# Patient Record
Sex: FEMALE | Race: WHITE | NOT HISPANIC OR LATINO | URBAN - METROPOLITAN AREA
[De-identification: names, ages, dates, MRNs, and addresses within clinical notes are randomized per-mention and may not be internally consistent; named-entity substitution may affect disease eponyms.]

---

## 2018-12-22 ENCOUNTER — INPATIENT (INPATIENT)
Facility: HOSPITAL | Age: 83
LOS: 4 days | Discharge: NURSING, REHAB OR LONG-TERM CARE | End: 2018-12-27
Attending: INTERNAL MEDICINE | Admitting: INTERNAL MEDICINE
Payer: MEDICARE

## 2018-12-22 VITALS
TEMPERATURE: 97 F | DIASTOLIC BLOOD PRESSURE: 53 MMHG | HEART RATE: 82 BPM | WEIGHT: 100.09 LBS | RESPIRATION RATE: 16 BRPM | SYSTOLIC BLOOD PRESSURE: 142 MMHG | OXYGEN SATURATION: 97 % | HEIGHT: 66 IN

## 2018-12-22 DIAGNOSIS — Z98.890 OTHER SPECIFIED POSTPROCEDURAL STATES: Chronic | ICD-10-CM

## 2018-12-22 LAB
ABO RH CONFIRMATION: SIGNIFICANT CHANGE UP
ALBUMIN SERPL ELPH-MCNC: 3.6 G/DL — SIGNIFICANT CHANGE UP (ref 3.3–5)
ALP SERPL-CCNC: 70 U/L — SIGNIFICANT CHANGE UP (ref 40–120)
ALT FLD-CCNC: 20 U/L — SIGNIFICANT CHANGE UP (ref 12–78)
ANION GAP SERPL CALC-SCNC: 8 MMOL/L — SIGNIFICANT CHANGE UP (ref 5–17)
APPEARANCE UR: CLEAR — SIGNIFICANT CHANGE UP
APTT BLD: 25.3 SEC — LOW (ref 27.5–36.3)
AST SERPL-CCNC: 23 U/L — SIGNIFICANT CHANGE UP (ref 15–37)
BACTERIA # UR AUTO: ABNORMAL
BASOPHILS # BLD AUTO: 0.04 K/UL — SIGNIFICANT CHANGE UP (ref 0–0.2)
BASOPHILS NFR BLD AUTO: 0.2 % — SIGNIFICANT CHANGE UP (ref 0–2)
BILIRUB SERPL-MCNC: 0.4 MG/DL — SIGNIFICANT CHANGE UP (ref 0.2–1.2)
BILIRUB UR-MCNC: NEGATIVE — SIGNIFICANT CHANGE UP
BLD GP AB SCN SERPL QL: SIGNIFICANT CHANGE UP
BUN SERPL-MCNC: 31 MG/DL — HIGH (ref 7–23)
CALCIUM SERPL-MCNC: 9 MG/DL — SIGNIFICANT CHANGE UP (ref 8.5–10.1)
CHLORIDE SERPL-SCNC: 107 MMOL/L — SIGNIFICANT CHANGE UP (ref 96–108)
CO2 SERPL-SCNC: 27 MMOL/L — SIGNIFICANT CHANGE UP (ref 22–31)
COLOR SPEC: YELLOW — SIGNIFICANT CHANGE UP
CREAT SERPL-MCNC: 1.07 MG/DL — SIGNIFICANT CHANGE UP (ref 0.5–1.3)
DIFF PNL FLD: ABNORMAL
EOSINOPHIL # BLD AUTO: 0 K/UL — SIGNIFICANT CHANGE UP (ref 0–0.5)
EOSINOPHIL NFR BLD AUTO: 0 % — SIGNIFICANT CHANGE UP (ref 0–6)
EPI CELLS # UR: NEGATIVE — SIGNIFICANT CHANGE UP
GLUCOSE SERPL-MCNC: 135 MG/DL — HIGH (ref 70–99)
GLUCOSE UR QL: NEGATIVE MG/DL — SIGNIFICANT CHANGE UP
HCT VFR BLD CALC: 45.6 % — HIGH (ref 34.5–45)
HGB BLD-MCNC: 15.2 G/DL — SIGNIFICANT CHANGE UP (ref 11.5–15.5)
HYALINE CASTS # UR AUTO: ABNORMAL /LPF
IMM GRANULOCYTES NFR BLD AUTO: 0.4 % — SIGNIFICANT CHANGE UP (ref 0–1.5)
INR BLD: 0.97 RATIO — SIGNIFICANT CHANGE UP (ref 0.88–1.16)
KETONES UR-MCNC: ABNORMAL
LEUKOCYTE ESTERASE UR-ACNC: ABNORMAL
LYMPHOCYTES # BLD AUTO: 0.94 K/UL — LOW (ref 1–3.3)
LYMPHOCYTES # BLD AUTO: 5.3 % — LOW (ref 13–44)
MCHC RBC-ENTMCNC: 30.6 PG — SIGNIFICANT CHANGE UP (ref 27–34)
MCHC RBC-ENTMCNC: 33.3 GM/DL — SIGNIFICANT CHANGE UP (ref 32–36)
MCV RBC AUTO: 91.8 FL — SIGNIFICANT CHANGE UP (ref 80–100)
MONOCYTES # BLD AUTO: 0.66 K/UL — SIGNIFICANT CHANGE UP (ref 0–0.9)
MONOCYTES NFR BLD AUTO: 3.7 % — SIGNIFICANT CHANGE UP (ref 2–14)
NEUTROPHILS # BLD AUTO: 16.15 K/UL — HIGH (ref 1.8–7.4)
NEUTROPHILS NFR BLD AUTO: 90.4 % — HIGH (ref 43–77)
NITRITE UR-MCNC: NEGATIVE — SIGNIFICANT CHANGE UP
NRBC # BLD: 0 /100 WBCS — SIGNIFICANT CHANGE UP (ref 0–0)
PH UR: 5 — SIGNIFICANT CHANGE UP (ref 5–8)
PLATELET # BLD AUTO: 185 K/UL — SIGNIFICANT CHANGE UP (ref 150–400)
POTASSIUM SERPL-MCNC: 4.2 MMOL/L — SIGNIFICANT CHANGE UP (ref 3.5–5.3)
POTASSIUM SERPL-SCNC: 4.2 MMOL/L — SIGNIFICANT CHANGE UP (ref 3.5–5.3)
PROT SERPL-MCNC: 7.3 GM/DL — SIGNIFICANT CHANGE UP (ref 6–8.3)
PROT UR-MCNC: 15 MG/DL
PROTHROM AB SERPL-ACNC: 10.8 SEC — SIGNIFICANT CHANGE UP (ref 10–12.9)
RBC # BLD: 4.97 M/UL — SIGNIFICANT CHANGE UP (ref 3.8–5.2)
RBC # FLD: 13.2 % — SIGNIFICANT CHANGE UP (ref 10.3–14.5)
RBC CASTS # UR COMP ASSIST: SIGNIFICANT CHANGE UP /HPF (ref 0–4)
SODIUM SERPL-SCNC: 142 MMOL/L — SIGNIFICANT CHANGE UP (ref 135–145)
SP GR SPEC: 1.02 — SIGNIFICANT CHANGE UP (ref 1.01–1.02)
TYPE + AB SCN PNL BLD: SIGNIFICANT CHANGE UP
UROBILINOGEN FLD QL: NEGATIVE MG/DL — SIGNIFICANT CHANGE UP
WBC # BLD: 17.86 K/UL — HIGH (ref 3.8–10.5)
WBC # FLD AUTO: 17.86 K/UL — HIGH (ref 3.8–10.5)
WBC UR QL: SIGNIFICANT CHANGE UP

## 2018-12-22 PROCEDURE — 93010 ELECTROCARDIOGRAM REPORT: CPT

## 2018-12-22 PROCEDURE — 72125 CT NECK SPINE W/O DYE: CPT | Mod: 26

## 2018-12-22 PROCEDURE — 74176 CT ABD & PELVIS W/O CONTRAST: CPT | Mod: 26

## 2018-12-22 PROCEDURE — 73552 X-RAY EXAM OF FEMUR 2/>: CPT | Mod: 26,LT

## 2018-12-22 PROCEDURE — 99285 EMERGENCY DEPT VISIT HI MDM: CPT

## 2018-12-22 PROCEDURE — 73502 X-RAY EXAM HIP UNI 2-3 VIEWS: CPT | Mod: 26,LT

## 2018-12-22 PROCEDURE — 70450 CT HEAD/BRAIN W/O DYE: CPT | Mod: 26

## 2018-12-22 PROCEDURE — 71045 X-RAY EXAM CHEST 1 VIEW: CPT | Mod: 26

## 2018-12-22 PROCEDURE — 73562 X-RAY EXAM OF KNEE 3: CPT | Mod: 26,LT

## 2018-12-22 PROCEDURE — 71250 CT THORAX DX C-: CPT | Mod: 26

## 2018-12-22 RX ORDER — SODIUM CHLORIDE 9 MG/ML
500 INJECTION INTRAMUSCULAR; INTRAVENOUS; SUBCUTANEOUS ONCE
Qty: 0 | Refills: 0 | Status: COMPLETED | OUTPATIENT
Start: 2018-12-22 | End: 2018-12-22

## 2018-12-22 RX ORDER — SODIUM CHLORIDE 9 MG/ML
3 INJECTION INTRAMUSCULAR; INTRAVENOUS; SUBCUTANEOUS ONCE
Qty: 0 | Refills: 0 | Status: COMPLETED | OUTPATIENT
Start: 2018-12-22 | End: 2018-12-22

## 2018-12-22 RX ORDER — MORPHINE SULFATE 50 MG/1
2 CAPSULE, EXTENDED RELEASE ORAL ONCE
Qty: 0 | Refills: 0 | Status: DISCONTINUED | OUTPATIENT
Start: 2018-12-22 | End: 2018-12-22

## 2018-12-22 RX ORDER — SODIUM CHLORIDE 9 MG/ML
1000 INJECTION, SOLUTION INTRAVENOUS
Qty: 0 | Refills: 0 | Status: DISCONTINUED | OUTPATIENT
Start: 2018-12-22 | End: 2018-12-23

## 2018-12-22 RX ORDER — ACETAMINOPHEN 500 MG
650 TABLET ORAL EVERY 6 HOURS
Qty: 0 | Refills: 0 | Status: DISCONTINUED | OUTPATIENT
Start: 2018-12-22 | End: 2018-12-23

## 2018-12-22 RX ORDER — ENOXAPARIN SODIUM 100 MG/ML
30 INJECTION SUBCUTANEOUS ONCE
Qty: 0 | Refills: 0 | Status: COMPLETED | OUTPATIENT
Start: 2018-12-22 | End: 2018-12-22

## 2018-12-22 RX ORDER — ACETAMINOPHEN 500 MG
1000 TABLET ORAL ONCE
Qty: 0 | Refills: 0 | Status: COMPLETED | OUTPATIENT
Start: 2018-12-22 | End: 2018-12-22

## 2018-12-22 RX ORDER — ONDANSETRON 8 MG/1
4 TABLET, FILM COATED ORAL EVERY 6 HOURS
Qty: 0 | Refills: 0 | Status: DISCONTINUED | OUTPATIENT
Start: 2018-12-22 | End: 2018-12-23

## 2018-12-22 RX ORDER — DOCUSATE SODIUM 100 MG
100 CAPSULE ORAL THREE TIMES A DAY
Qty: 0 | Refills: 0 | Status: DISCONTINUED | OUTPATIENT
Start: 2018-12-22 | End: 2018-12-23

## 2018-12-22 RX ADMIN — Medication 400 MILLIGRAM(S): at 15:36

## 2018-12-22 RX ADMIN — MORPHINE SULFATE 2 MILLIGRAM(S): 50 CAPSULE, EXTENDED RELEASE ORAL at 12:02

## 2018-12-22 RX ADMIN — SODIUM CHLORIDE 3 MILLILITER(S): 9 INJECTION INTRAMUSCULAR; INTRAVENOUS; SUBCUTANEOUS at 12:03

## 2018-12-22 RX ADMIN — ENOXAPARIN SODIUM 30 MILLIGRAM(S): 100 INJECTION SUBCUTANEOUS at 15:36

## 2018-12-22 RX ADMIN — Medication 100 MILLIGRAM(S): at 23:03

## 2018-12-22 RX ADMIN — MORPHINE SULFATE 2 MILLIGRAM(S): 50 CAPSULE, EXTENDED RELEASE ORAL at 12:26

## 2018-12-22 RX ADMIN — SODIUM CHLORIDE 500 MILLILITER(S): 9 INJECTION INTRAMUSCULAR; INTRAVENOUS; SUBCUTANEOUS at 12:02

## 2018-12-22 NOTE — H&P ADULT - NSHPPHYSICALEXAM_GEN_ALL_CORE
Vital Signs Last 24 Hrs  T(C): 36.1 (22 Dec 2018 09:27), Max: 36.1 (22 Dec 2018 09:27)  T(F): 97 (22 Dec 2018 09:27), Max: 97 (22 Dec 2018 09:27)  HR: 82 (22 Dec 2018 09:27) (82 - 82)  BP: 142/53 (22 Dec 2018 09:27) (142/53 - 142/53)  BP(mean): --  RR: 16 (22 Dec 2018 09:27) (16 - 16)  SpO2: 97% (22 Dec 2018 09:27) (97% - 97%)

## 2018-12-22 NOTE — ED PROVIDER NOTE - ENMT, MLM
neck non tender- supple. Airway patent, Nasal mucosa clear. Mouth with normal mucosa. Throat has no vesicles, no oropharyngeal exudates and uvula is midline.

## 2018-12-22 NOTE — CHART NOTE - NSCHARTNOTEFT_GEN_A_CORE
Ortho Preop Note    Patient is a 86y old  Female who presents with a chief complaint of L hip pain    Diagnosis: L FN Fx  Procedure: Thom  Surgeon: Harley                          15.2   17.86 )-----------( 185      ( 22 Dec 2018 11:52 )             45.6     12    142  |  107  |  31<H>  ----------------------------<  135<H>  4.2   |  27  |  1.07    Ca    9.0      22 Dec 2018 11:52    TPro  7.3  /  Alb  3.6  /  TBili  0.4  /  DBili  x   /  AST  23  /  ALT  20  /  AlkPhos  70      PT/INR - ( 22 Dec 2018 12:52 )   PT: 10.8 sec;   INR: 0.97 ratio         PTT - ( 22 Dec 2018 12:52 )  PTT:25.3 sec  Urinalysis Basic - ( 22 Dec 2018 11:52 )    Color: Yellow / Appearance: Clear / S.025 / pH: x  Gluc: x / Ketone: Trace  / Bili: Negative / Urobili: Negative mg/dL   Blood: x / Protein: 15 mg/dL / Nitrite: Negative   Leuk Esterase: Trace / RBC: 0-2 /HPF / WBC 0-2   Sq Epi: x / Non Sq Epi: Negative / Bacteria: Occasional        [x ] Type & Screen  [x ] CBC  [x ] BMP  [x ] PT/PTT/INR  [x ] Urinalysis  [x ] Chest X-ray  [x ] EKG  [x ] NPO/IVF  [x ] Consent  [x ] Clearance  [x ] Added on to OR Schedule  [x ] Anti-coagulation held      Ortho Pager [phone]

## 2018-12-22 NOTE — H&P ADULT - ASSESSMENT
#S/p mechanical fall with LT hip fracture  Admit to 2 [facility] eval appreciated  Bedrest  NPO p MN for OR tomorrow  Pain meds prn, will try to avoid narcotics as will make dementia worse  DVT proph- Lovenox x1 then on hold for OR  Patient is medically optimized for OR    #Dementia  Supportive    #Dispo- inpatient admit, pt is medically optimized for OR

## 2018-12-22 NOTE — ED PROVIDER NOTE - MEDICAL DECISION MAKING DETAILS
elderly female +dementia BIBA s/p supposed mechanical fall, unknown if head injury, pt without memory of incident, hx VIA daughter who did not witness event, pt c/o back pain and unable to bear weight on L leg/ L hip pain. Pt unable to lift SLR. Plan PAN SCAN, non contrast, XR, pelvis L hip/L femur/ L knee, labs, EKG, observe and re-assess.

## 2018-12-22 NOTE — H&P ADULT - HISTORY OF PRESENT ILLNESS
87yo/F with PMH dementia, chronic back pain s/p prior laminectomies x2  presented s/p mechanical fall. She was walking to get her robe when turned and lost her balance sustaining fall. She was c/o LT hip pain and inability to ambulate afterwards. Denies LOC. No head trauma. Not on Aspirin or blood thinners. No prior h/o cardiac problems

## 2018-12-22 NOTE — ED PROVIDER NOTE - SKIN, MLM
two old scabs  to R forehead, no clinical evidence of facial injury. Skin normal color for race, warm, dry and intact. No evidence of rash.

## 2018-12-22 NOTE — CONSULT NOTE ADULT - SUBJECTIVE AND OBJECTIVE BOX
86y Female presents to [facility] ED s/p [facility] fall earlier today c/o severe L hip pain and inability to ambulate.  Limited ability to give history given baseline dementia; Daughter at bedside. Patient denies headstrike or LOC. Localizes pain to L hip/femur. Patient denies radiation of pain. Patient denies numbness/tingling/burning in the LLE. No other bone/joint complaints. Patient is a home ambulator at baseline with occasional use of walker. Of note, she underwent two lumbar surgeries that her daughter at bedside describes as laminectomies however as a result has had chronic R foot drop. She has an AFO she doesnt wear. Mostly walks around the house without her walker. Does have a high steppage gait to clear her right foot per her daughter. Patient has no issues w/ ADLs/IADLs.     PAST MEDICAL & SURGICAL HISTORY:  Dementia  History of lumbar laminectomy    MEDICATIONS  (STANDING):  docusate sodium 100 milliGRAM(s) Oral three times a day  multivitamin 1 Tablet(s) Oral daily    MEDICATIONS  (PRN):  acetaminophen   Tablet .. 650 milliGRAM(s) Oral every 6 hours PRN Temp greater or equal to 38C (100.4F), Mild Pain (1 - 3)  aluminum hydroxide/magnesium hydroxide/simethicone Suspension 30 milliLiter(s) Oral every 4 hours PRN Dyspepsia  ondansetron Injectable 4 milliGRAM(s) IV Push every 6 hours PRN Nausea    Allergies    aspirin (Unknown)    Intolerances      T(C): 36.8 (12-22-18 @ 15:38), Max: 36.8 (12-22-18 @ 15:38)  HR: 92 (12-22-18 @ 15:38) (82 - 92)  BP: 139/60 (12-22-18 @ 15:38) (139/60 - 142/53)  RR: 17 (12-22-18 @ 15:38) (16 - 17)  SpO2: 94% (12-22-18 @ 15:38) (94% - 97%)  Wt(kg): --    PE   LLE:  Skin intact; No ecchymosis/soft tissue swelling  Compartments soft; + TTP about hip. No TTP to knee/leg/ankle/foot   ROM limited 2/2 pain   Unable to SLR; + Log Roll/Heel Strike  Motor intact GS/TA/FHL/EHL  SILT L2-S1  DP/PT pulses 2+    RLE/BUE:   No bony TTP; Good ROM w/o pain; Exam Unremarkable    RLE:   0/5 Dorsiflexion; 2/5 EHL.   5/5 Plantarflexion; 5/5 FHL.   SILT to entire RLE.     Secondary Survey: No TTP over bony prominences, SILT, palpable pulses, full/painless range of motion, compartments soft  TTP about the lumbar paraspinal musculature. No midline TTP.   No thoracic or cervical TTP.    Imaging:  XR demonstrating displaced L femoral neck fracture.     CT abdomen/pelvis/chest reviewed by radiologist. No acute bony abnormalities per radiology.     86y Female with displaced L femoral neck fracture    - Pain control  - NPO/IVF after midnight tonight  - FU UCx  - EKG  - CXR pending  - Medical clearance - cleared by Dr. Haynes  - Plan for OR for L hip hemiarthroplasty tomorrow with Dr. Souza at 8am  - Surgical consent obtained with daughter/HCP at bedside.   - May require use of RLE AFO for post op rehab, will have daughter bring from home.   - Above reviewed with Dr. Souza who agrees with plan.

## 2018-12-22 NOTE — H&P ADULT - NSHPLABSRESULTS_GEN_ALL_CORE
15.2   17.86 )-----------( 185      ( 22 Dec 2018 11:52 )             45.6     22 Dec 2018 11:52    142    |  107    |  31     ----------------------------<  135    4.2     |  27     |  1.07     Ca    9.0        22 Dec 2018 11:52    TPro  7.3    /  Alb  3.6    /  TBili  0.4    /  DBili  x      /  AST  23     /  ALT  20     /  AlkPhos  70     22 Dec 2018 11:52    LIVER FUNCTIONS - ( 22 Dec 2018 11:52 )  Alb: 3.6 g/dL / Pro: 7.3 gm/dL / ALK PHOS: 70 U/L / ALT: 20 U/L / AST: 23 U/L / GGT: x           PT/INR - ( 22 Dec 2018 12:52 )   PT: 10.8 sec;   INR: 0.97 ratio      PTT - ( 22 Dec 2018 12:52 )  PTT:25.3 sec    Urinalysis Basic - ( 22 Dec 2018 11:52 )  Color: Yellow / Appearance: Clear / S.025 / pH: x  Gluc: x / Ketone: Trace  / Bili: Negative / Urobili: Negative mg/dL   Blood: x / Protein: 15 mg/dL / Nitrite: Negative   Leuk Esterase: Trace / RBC: 0-2 /HPF / WBC 0-2   Sq Epi: x / Non Sq Epi: Negative / Bacteria: Occasional

## 2018-12-22 NOTE — ED PROVIDER NOTE - OBJECTIVE STATEMENT
87 y/o F with a PMHx of Dementia, accompanied by daughter Maria Alejandra who reports patient is here for an evaluation of L leg pain s/p mechanical fall. Pt's  reportedly noted pt was walking over to the other side of the bed to get her robe when she turned around and fell; when the daughter came up to see her she was found on her back. Pt was c/o of L leg pain and back pain. Daughter tried to get patient up to walk but had difficult bearing weight to L leg. Denies anticoagulants. NKDA. Denies head ache, neck pain, shoulder pain, b/l arm pain. PMD. .

## 2018-12-22 NOTE — ED PROVIDER NOTE - UNABLE TO OBTAIN
PATIENT IS A POOR HISTORIAN, HX OBTAINED BY DAUGHTER, NO ACTIVE COMPLAINTS. Dementia PATIENT IS A POOR HISTORIAN, HX OBTAINED BY DAUGHTER.

## 2018-12-22 NOTE — CONSULT NOTE ADULT - ATTENDING COMMENTS
Pt seen and examined.  Confused.   H&P and chart reviewed as well as relevant imaging.   Left FN hip fx displaced  Surgical indications met vs. conservative tx.  All RBA discussed at length which include but are not limited to bleeding infection, nerve, vascular damage, DVT, PE, foot drop, louie-implant fracture, limb length inequality, loss of limb, loss of life, risks associated with general anesthesia.  All questions answered.  Plan for left hip hemiarthroplasty.

## 2018-12-22 NOTE — ED PROVIDER NOTE - CARE PLAN
Principal Discharge DX:	Closed fracture of left hip, initial encounter  Secondary Diagnosis:	Fall, initial encounter  Secondary Diagnosis:	Dementia

## 2018-12-23 ENCOUNTER — RESULT REVIEW (OUTPATIENT)
Age: 83
End: 2018-12-23

## 2018-12-23 ENCOUNTER — TRANSCRIPTION ENCOUNTER (OUTPATIENT)
Age: 83
End: 2018-12-23

## 2018-12-23 LAB
ANION GAP SERPL CALC-SCNC: 7 MMOL/L — SIGNIFICANT CHANGE UP (ref 5–17)
ANION GAP SERPL CALC-SCNC: 8 MMOL/L — SIGNIFICANT CHANGE UP (ref 5–17)
APTT BLD: 28.7 SEC — SIGNIFICANT CHANGE UP (ref 27.5–36.3)
BUN SERPL-MCNC: 25 MG/DL — HIGH (ref 7–23)
BUN SERPL-MCNC: 28 MG/DL — HIGH (ref 7–23)
CALCIUM SERPL-MCNC: 8.2 MG/DL — LOW (ref 8.5–10.1)
CALCIUM SERPL-MCNC: 8.7 MG/DL — SIGNIFICANT CHANGE UP (ref 8.5–10.1)
CHLORIDE SERPL-SCNC: 108 MMOL/L — SIGNIFICANT CHANGE UP (ref 96–108)
CHLORIDE SERPL-SCNC: 108 MMOL/L — SIGNIFICANT CHANGE UP (ref 96–108)
CO2 SERPL-SCNC: 23 MMOL/L — SIGNIFICANT CHANGE UP (ref 22–31)
CO2 SERPL-SCNC: 28 MMOL/L — SIGNIFICANT CHANGE UP (ref 22–31)
CREAT SERPL-MCNC: 0.85 MG/DL — SIGNIFICANT CHANGE UP (ref 0.5–1.3)
CREAT SERPL-MCNC: 1.07 MG/DL — SIGNIFICANT CHANGE UP (ref 0.5–1.3)
CULTURE RESULTS: NO GROWTH — SIGNIFICANT CHANGE UP
GLUCOSE SERPL-MCNC: 118 MG/DL — HIGH (ref 70–99)
GLUCOSE SERPL-MCNC: 126 MG/DL — HIGH (ref 70–99)
HCT VFR BLD CALC: 43 % — SIGNIFICANT CHANGE UP (ref 34.5–45)
HCT VFR BLD CALC: 43.9 % — SIGNIFICANT CHANGE UP (ref 34.5–45)
HGB BLD-MCNC: 14.4 G/DL — SIGNIFICANT CHANGE UP (ref 11.5–15.5)
HGB BLD-MCNC: 14.9 G/DL — SIGNIFICANT CHANGE UP (ref 11.5–15.5)
INR BLD: 1.1 RATIO — SIGNIFICANT CHANGE UP (ref 0.88–1.16)
MCHC RBC-ENTMCNC: 30 PG — SIGNIFICANT CHANGE UP (ref 27–34)
MCHC RBC-ENTMCNC: 30.6 PG — SIGNIFICANT CHANGE UP (ref 27–34)
MCHC RBC-ENTMCNC: 33.5 GM/DL — SIGNIFICANT CHANGE UP (ref 32–36)
MCHC RBC-ENTMCNC: 33.9 GM/DL — SIGNIFICANT CHANGE UP (ref 32–36)
MCV RBC AUTO: 88.3 FL — SIGNIFICANT CHANGE UP (ref 80–100)
MCV RBC AUTO: 91.3 FL — SIGNIFICANT CHANGE UP (ref 80–100)
NRBC # BLD: 0 /100 WBCS — SIGNIFICANT CHANGE UP (ref 0–0)
NRBC # BLD: 0 /100 WBCS — SIGNIFICANT CHANGE UP (ref 0–0)
PLATELET # BLD AUTO: 154 K/UL — SIGNIFICANT CHANGE UP (ref 150–400)
PLATELET # BLD AUTO: 156 K/UL — SIGNIFICANT CHANGE UP (ref 150–400)
POTASSIUM SERPL-MCNC: 3.9 MMOL/L — SIGNIFICANT CHANGE UP (ref 3.5–5.3)
POTASSIUM SERPL-MCNC: 4.6 MMOL/L — SIGNIFICANT CHANGE UP (ref 3.5–5.3)
POTASSIUM SERPL-SCNC: 3.9 MMOL/L — SIGNIFICANT CHANGE UP (ref 3.5–5.3)
POTASSIUM SERPL-SCNC: 4.6 MMOL/L — SIGNIFICANT CHANGE UP (ref 3.5–5.3)
PROTHROM AB SERPL-ACNC: 12.2 SEC — SIGNIFICANT CHANGE UP (ref 10–12.9)
RBC # BLD: 4.71 M/UL — SIGNIFICANT CHANGE UP (ref 3.8–5.2)
RBC # BLD: 4.97 M/UL — SIGNIFICANT CHANGE UP (ref 3.8–5.2)
RBC # FLD: 13.2 % — SIGNIFICANT CHANGE UP (ref 10.3–14.5)
RBC # FLD: 13.3 % — SIGNIFICANT CHANGE UP (ref 10.3–14.5)
SODIUM SERPL-SCNC: 139 MMOL/L — SIGNIFICANT CHANGE UP (ref 135–145)
SODIUM SERPL-SCNC: 143 MMOL/L — SIGNIFICANT CHANGE UP (ref 135–145)
SPECIMEN SOURCE: SIGNIFICANT CHANGE UP
WBC # BLD: 16.38 K/UL — HIGH (ref 3.8–10.5)
WBC # BLD: 19.42 K/UL — HIGH (ref 3.8–10.5)
WBC # FLD AUTO: 16.38 K/UL — HIGH (ref 3.8–10.5)
WBC # FLD AUTO: 19.42 K/UL — HIGH (ref 3.8–10.5)

## 2018-12-23 PROCEDURE — 93010 ELECTROCARDIOGRAM REPORT: CPT

## 2018-12-23 PROCEDURE — 99223 1ST HOSP IP/OBS HIGH 75: CPT

## 2018-12-23 PROCEDURE — 88305 TISSUE EXAM BY PATHOLOGIST: CPT | Mod: 26

## 2018-12-23 PROCEDURE — 76000 FLUOROSCOPY <1 HR PHYS/QHP: CPT | Mod: 26

## 2018-12-23 PROCEDURE — 99223 1ST HOSP IP/OBS HIGH 75: CPT | Mod: 57

## 2018-12-23 PROCEDURE — 73501 X-RAY EXAM HIP UNI 1 VIEW: CPT | Mod: 26,LT

## 2018-12-23 PROCEDURE — 27125 PARTIAL HIP REPLACEMENT: CPT | Mod: LT

## 2018-12-23 PROCEDURE — 88311 DECALCIFY TISSUE: CPT | Mod: 26

## 2018-12-23 RX ORDER — SODIUM CHLORIDE 9 MG/ML
1000 INJECTION INTRAMUSCULAR; INTRAVENOUS; SUBCUTANEOUS
Qty: 0 | Refills: 0 | Status: DISCONTINUED | OUTPATIENT
Start: 2018-12-23 | End: 2018-12-23

## 2018-12-23 RX ORDER — SODIUM CHLORIDE 9 MG/ML
1000 INJECTION, SOLUTION INTRAVENOUS
Qty: 0 | Refills: 0 | Status: DISCONTINUED | OUTPATIENT
Start: 2018-12-23 | End: 2018-12-26

## 2018-12-23 RX ORDER — LISINOPRIL 2.5 MG/1
2.5 TABLET ORAL DAILY
Qty: 0 | Refills: 0 | Status: DISCONTINUED | OUTPATIENT
Start: 2018-12-23 | End: 2018-12-26

## 2018-12-23 RX ORDER — POLYETHYLENE GLYCOL 3350 17 G/17G
17 POWDER, FOR SOLUTION ORAL DAILY
Qty: 0 | Refills: 0 | Status: DISCONTINUED | OUTPATIENT
Start: 2018-12-23 | End: 2018-12-27

## 2018-12-23 RX ORDER — DOCUSATE SODIUM 100 MG
100 CAPSULE ORAL THREE TIMES A DAY
Qty: 0 | Refills: 0 | Status: DISCONTINUED | OUTPATIENT
Start: 2018-12-23 | End: 2018-12-27

## 2018-12-23 RX ORDER — FENTANYL CITRATE 50 UG/ML
50 INJECTION INTRAVENOUS
Qty: 0 | Refills: 0 | Status: DISCONTINUED | OUTPATIENT
Start: 2018-12-23 | End: 2018-12-23

## 2018-12-23 RX ORDER — OXYCODONE HYDROCHLORIDE 5 MG/1
2.5 TABLET ORAL EVERY 4 HOURS
Qty: 0 | Refills: 0 | Status: DISCONTINUED | OUTPATIENT
Start: 2018-12-23 | End: 2018-12-27

## 2018-12-23 RX ORDER — HEPARIN SODIUM 5000 [USP'U]/ML
5000 INJECTION INTRAVENOUS; SUBCUTANEOUS EVERY 8 HOURS
Qty: 0 | Refills: 0 | Status: DISCONTINUED | OUTPATIENT
Start: 2018-12-23 | End: 2018-12-27

## 2018-12-23 RX ORDER — SENNA PLUS 8.6 MG/1
2 TABLET ORAL AT BEDTIME
Qty: 0 | Refills: 0 | Status: DISCONTINUED | OUTPATIENT
Start: 2018-12-23 | End: 2018-12-27

## 2018-12-23 RX ORDER — ACETAMINOPHEN 500 MG
650 TABLET ORAL EVERY 6 HOURS
Qty: 0 | Refills: 0 | Status: COMPLETED | OUTPATIENT
Start: 2018-12-23 | End: 2018-12-26

## 2018-12-23 RX ORDER — ONDANSETRON 8 MG/1
4 TABLET, FILM COATED ORAL ONCE
Qty: 0 | Refills: 0 | Status: DISCONTINUED | OUTPATIENT
Start: 2018-12-23 | End: 2018-12-23

## 2018-12-23 RX ORDER — HYDRALAZINE HCL 50 MG
5 TABLET ORAL ONCE
Qty: 0 | Refills: 0 | Status: COMPLETED | OUTPATIENT
Start: 2018-12-23 | End: 2018-12-23

## 2018-12-23 RX ORDER — CEFAZOLIN SODIUM 1 G
2000 VIAL (EA) INJECTION EVERY 8 HOURS
Qty: 0 | Refills: 0 | Status: COMPLETED | OUTPATIENT
Start: 2018-12-23 | End: 2018-12-24

## 2018-12-23 RX ORDER — OXYCODONE HYDROCHLORIDE 5 MG/1
5 TABLET ORAL EVERY 4 HOURS
Qty: 0 | Refills: 0 | Status: DISCONTINUED | OUTPATIENT
Start: 2018-12-23 | End: 2018-12-27

## 2018-12-23 RX ORDER — ONDANSETRON 8 MG/1
4 TABLET, FILM COATED ORAL EVERY 6 HOURS
Qty: 0 | Refills: 0 | Status: DISCONTINUED | OUTPATIENT
Start: 2018-12-23 | End: 2018-12-27

## 2018-12-23 RX ORDER — HYDROMORPHONE HYDROCHLORIDE 2 MG/ML
0.5 INJECTION INTRAMUSCULAR; INTRAVENOUS; SUBCUTANEOUS EVERY 4 HOURS
Qty: 0 | Refills: 0 | Status: DISCONTINUED | OUTPATIENT
Start: 2018-12-23 | End: 2018-12-23

## 2018-12-23 RX ADMIN — OXYCODONE HYDROCHLORIDE 5 MILLIGRAM(S): 5 TABLET ORAL at 16:50

## 2018-12-23 RX ADMIN — Medication 5 MILLIGRAM(S): at 02:22

## 2018-12-23 RX ADMIN — Medication 100 MILLIGRAM(S): at 17:22

## 2018-12-23 RX ADMIN — SODIUM CHLORIDE 70 MILLILITER(S): 9 INJECTION, SOLUTION INTRAVENOUS at 02:22

## 2018-12-23 RX ADMIN — Medication 650 MILLIGRAM(S): at 03:51

## 2018-12-23 RX ADMIN — SODIUM CHLORIDE 75 MILLILITER(S): 9 INJECTION INTRAMUSCULAR; INTRAVENOUS; SUBCUTANEOUS at 11:23

## 2018-12-23 RX ADMIN — Medication 650 MILLIGRAM(S): at 17:21

## 2018-12-23 RX ADMIN — HEPARIN SODIUM 5000 UNIT(S): 5000 INJECTION INTRAVENOUS; SUBCUTANEOUS at 20:56

## 2018-12-23 RX ADMIN — OXYCODONE HYDROCHLORIDE 5 MILLIGRAM(S): 5 TABLET ORAL at 15:54

## 2018-12-23 RX ADMIN — Medication 650 MILLIGRAM(S): at 03:21

## 2018-12-23 RX ADMIN — LISINOPRIL 2.5 MILLIGRAM(S): 2.5 TABLET ORAL at 15:53

## 2018-12-23 RX ADMIN — Medication 100 MILLIGRAM(S): at 15:55

## 2018-12-23 NOTE — DISCHARGE NOTE ADULT - ADDITIONAL INSTRUCTIONS
remove marcum in 3-4 days when more ambulatory for trial of void. if still unable to void, consult urology.

## 2018-12-23 NOTE — DISCHARGE NOTE ADULT - CARE PROVIDER_API CALL
Fernie Souza (DO), Orthopaedic Surgery  [address] [place] [address]  Phone: [phone]  Fax: [phone]

## 2018-12-23 NOTE — CONSULT NOTE ADULT - SUBJECTIVE AND OBJECTIVE BOX
HPI:  87yo/F with PMH dementia, chronic back pain s/p prior laminectomies x2  presented s/p mechanical fall. She was walking to get her robe when turned and lost her balance sustaining fall. She was c/o LT hip pain and inability to ambulate afterwards. Denies LOC. No head trauma. Not on Aspirin or blood thinners. No prior h/o cardiac problems (22 Dec 2018 14:32)      Patient is a 86y old  Female who presents with a chief complaint of s/p mechanical fall (23 Dec 2018 06:45)  s/p left hip bertrand on 18.    Consulted by Dr. Fernie Souza   for VTE prophylaxis, risk stratification, and anticoagulation management.    PAST MEDICAL & SURGICAL HISTORY:  Dementia  History of lumbar laminectomy    CAPRINI SCORE    AGE RELATED RISK FACTORS                                                       MOBILITY RELATED FACTORS  [ ] Age 41-60 years                                            (1 Point)                  [ ] Bed rest                                                        (1 Point)  [ ] Age: 61-74 years                                           (2 Points)                [ ] Plaster cast                                                   (2 Points)  [ x] Age= 75 years                                              (3 Points)                 [ ] Bed bound for more than 72 hours                   (2 Points)    DISEASE RELATED RISK FACTORS                                               GENDER SPECIFIC FACTORS  [ ] Edema in the lower extremities                       (1 Point)                  [ ] Pregnancy                                                     (1 Point)  [ ] Varicose veins                                               (1 Point)                  [ ] Post-partum < 6 weeks                                   (1 Point)             [ ] BMI > 25 Kg/m2                                            (1 Point)                  [ ] Hormonal therapy  or oral contraception            (1 Point)                 [ ] Sepsis (in the previous month)                        (1 Point)                  [ ] History of pregnancy complications  [ ] Pneumonia or serious lung disease                                               [ ] Unexplained or recurrent                       (1 Point)           (in the previous month)                               (1 Point)  [ ] Abnormal pulmonary function test                     (1 Point)                 SURGERY RELATED RISK FACTORS  [ ] Acute myocardial infarction                              (1 Point)                 [ ]  Section                                            (1 Point)  [ ] Congestive heart failure (in the previous month)  (1 Point)                 [ ] Minor surgery                                                 (1 Point)   [ ] Inflammatory bowel disease                             (1 Point)                 [ ] Arthroscopic surgery                                        (2 Points)  [ ] Central venous access                                    (2 Points)                [ ] General surgery lasting more than 45 minutes   (2 Points)       [ ] Stroke (in the previous month)                          (5 Points)               [ ] Elective arthroplasty                                        (5 Points)            [  ] Malignancy (present or past )                             (2 points)                                                                                                                                    HEMATOLOGY RELATED FACTORS                                                 TRAUMA RELATED RISK FACTORS  [ ] Prior episodes of VTE                                     (3 Points)                 [x ] Fracture of the hip, pelvis, or leg                       (5 Points)  [ ] Positive family history for VTE                         (3 Points)                 [ ] Acute spinal cord injury (in the previous month)  (5 Points)  [ ] Prothrombin 64736 A                                      (3 Points)                 [ ] Paralysis  (less than 1 month)                          (5 Points)  [ ] Factor V Leiden                                             (3 Points)                 [ ] Multiple Trauma within 1 month                         (5 Points)  [ ] Lupus anticoagulants                                     (3 Points)                                                           [ ] Anticardiolipin antibodies                                (3 Points)                                                       [ ] High homocysteine in the blood                      (3 Points)                                             [ ] Other congenital or acquired thrombophilia       (3 Points)                                                [ ] Heparin induced thrombocytopenia                  (3 Points)                                          Total Score [ 8 ]    IMPROVE Bleeding Risk Score: 4.5    Falls Risk:   High ( x )  Mod (  )  Low (  )  cr cl: 33.4  EBL: 100  ml  18 Pt seen at bedside on 2North daughter present.  Discussed her anticoagulation with heparin for now then will switch to Lovenox once pt is stable.  Daughter v/u of the need and is agreeable.  Pt is alert to person only.     Vital Signs Last 24 Hrs  T(C): 36.6 (23 Dec 2018 11:30), Max: 37 (23 Dec 2018 01:15)  T(F): 97.9 (23 Dec 2018 11:30), Max: 98.6 (23 Dec 2018 01:15)  HR: 82 (23 Dec 2018 11:30) (76 - 99)  BP: 136/58 (23 Dec 2018 11:30) (126/68 - 167/78)  BP(mean): --  RR: 16 (23 Dec 2018 11:30) (13 - 18)  SpO2: 95% (23 Dec 2018 11:30) (94% - 100%)  FAMILY HISTORY:  Family history of cardiac disorder (Father)    Denies any personal or familial history of clotting or bleeding disorders.    Allergies    aspirin (Unknown)    Intolerances        REVIEW OF SYSTEMS    (  )Fever	     (  )Constipation	(  )SOB				(  )Headache	(  )Dysuria  (  )Chills	     (  )Melena	(  )Dyspnea present on exertion	                    (  )Dizziness                    (  )Polyuria  (  )Nausea	     (  )Hematochezia	(  )Cough			                    (  )Syncope   	(  )Hematuria  (  )Vomiting    (  )Chest Pain	(  )Wheezing			( x )Weakness  (  )Diarrhea     (  )Palpitations	(  )Anorexia			(  )Myalgia    All  review of systems negative except ones indicated above. : Yes      PHYSICAL EXAM:    Constitutional: Appears Well    Neurological: A& O x 1 person    Skin: Warm    Respiratory and Thorax: normal effort; Breath sounds: normal; No rales/wheezing/rhonchi  	  Cardiovascular: S1, S2, regular, NMBR	    Gastrointestinal: BS + x 4Q, nontender	    Genitourinary:  Bladder nondistended, nontender    Musculoskeletal:   General Right:   no muscle/joint tenderness,   normal tone, no joint swelling,   ROM: limited/full	    General Left:   no muscle/joint tenderness,   normal tone, no joint swelling,   ROM: limited/full    Hip:  Left: Dressing CDI;     Lower extrems:   Right: no calf tenderness              negative mary's sign               + pedal pulses    Left:   no calf tenderness              negative mary's sign               + pedal pulses                          14.4   19.42 )-----------( 154      ( 23 Dec 2018 11:09 )             43.0           143  |  108  |  25<H>  ----------------------------<  118<H>  4.6   |  28  |  1.07    Ca    8.2<L>      23 Dec 2018 11:09    TPro  7.3  /  Alb  3.6  /  TBili  0.4  /  DBili  x   /  AST  23  /  ALT  20  /  AlkPhos  70  12      PT/INR - ( 23 Dec 2018 04:44 )   PT: 12.2 sec;   INR: 1.10 ratio         PTT - ( 23 Dec 2018 04:44 )  PTT:28.7 sec	  			   Xray Hip w/ Pelvis 2 or 3 Views, Left (12.22.18 @ 12:43) >  Impression:    Displaced impacted angulatedleft femoral neck fracture          MEDICATIONS  (STANDING):  heparin  Injectable 5000 Unit(s) SubCutaneous every 8 hours  lactated ringers. 1000 milliLiter(s) IV Continuous <Continuous>  sodium chloride 0.9%. 1000 milliLiter(s) IV Continuous <Continuous>          DVT Prophylaxis:  LMWH                   (  )  Heparin SQ           ( x )  Coumadin             (  )  Xarelto                  (  )  Eliquis                   (  )  Venodynes           ( x )  Ambulation          ( x )  UFH                       (  )  Contraindicated  (  )

## 2018-12-23 NOTE — CONSULT NOTE ADULT - ASSESSMENT
I:  This is a86yo/F with PMH dementia, chronic back pain s/p prior laminectomies x2  presented s/p mechanical fall. Pt found to have Displaced impacted angulatedleft femoral neck fracture , now s/p left hip bertrand on 12-23-18.  Pt has high thrombosis risks and requires anticoagulation prophylaxis.         :Heparin 5,000 units sq q8h start 2200 today will consider to switch to Lovenox 40mg sq daily for four weeks post procedure one pt is stable.   :daily cbc/bmp  :LE Venodynes  : increase mobility as tolerated  :Thanks for consult will f/u

## 2018-12-23 NOTE — DISCHARGE NOTE ADULT - HOSPITAL COURSE
85yo/F with PMH dementia, chronic back pain s/p prior laminectomies x2  presented s/p mechanical fall. She was walking to get her robe when turned and lost her balance sustaining fall. She was c/o LT hip pain and inability to ambulate afterwards. Denies LOC. No head trauma. Not on Aspirin or blood thinners. No prior h/o cardiac problems . She was admitted with left hip fracture. underwent left hip hemiarthroplasty.   Hospital course notable for TERENCE due to urinary retention/dehydration. Marcum placed and pt given ivf with improvement in renal function.   Pt's daughter reported patient had word finding difficulty since surgery. CT head done today was negative. Felt it was most likely related to anesthesia/narcotics. Pt will be discharged to rehab today. She is advised trial of void at rehab. IF still with difficulty, advise urology evaluation     for physical exam please see progress note from 12/27  LABS:                        11.5   12.12 )-----------( 169      ( 27 Dec 2018 05:46 )             35.0     12-27    142  |  110<H>  |  29<H>  ----------------------------<  109<H>  4.5   |  24  |  0.72    Ca    8.4<L>      27 Dec 2018 05:46    CT Head No Cont (12.27.18 @ 15:39) >  No parenchymal contusion, hemorrhage or extra-axial collection.  No CT evidence of an acute territorial infarction.  Diffuse atrophy, and chronic small vessel ischemic changes.  If symptoms persist, and additional imaging evaluation is needed,   follow-up MRI is suggested.     CT Abdomen and Pelvis No Cont (12.22.18 @ 12:22) >  IMPRESSION:    Displaced left femoral neck fracture.  No other evidence of solid visceral injury in the chest, abdomen or   pelvis on this noncontrast study.      FINAL DIAGNOSIS  #S/p mechanical fall with LT hip fracture   -s/p LT hip hemiarthroplasty  #HTN- resolved  #TERENCE/urinary retention now s/p marcum  #Sinus Tachycardia due to dehydration/pain, improved  #Leukocytosis-likely reactive  #Metabolic encephalopathy  #Dementia  #Hypokalemia    Time taken for dc 60 min  plan d/w daughter at bedside.

## 2018-12-23 NOTE — PROGRESS NOTE ADULT - ASSESSMENT
86F with L femoral neck hip fx  NWB LLE, bedrest  Pain control  NPO  IVF while NPO  Hold chemical DVT ppx  Medical optimization for OR today  Plan for OR today for L hip hemiarthroplasty  Discussed with attending

## 2018-12-23 NOTE — DISCHARGE NOTE ADULT - CARE PLAN
Principal Discharge DX:	Closed fracture of left hip, initial encounter  Goal:	Return to baseline ADLs  Assessment and plan of treatment:	1.	Pain control  2.	Walking with full weight bearing as tolerated, with assistive devices as needed.  3.	DVT prophylaxis  4.	Physical therapy as needed  5.	Follow up with Dr. Souza as outpatient in 10-14 Days after discharge from the hospital or rehab. Call office for appointment.  6.	Remove staples post-op day 14.  7.	Ice and elevate affected area as needed  8.	Keep dressing clean, dry and intact  9.	Posterior hip precautions - Abduction pillow while seated or supine. Do not bend hip past 90 degrees. Do not turn knee/foot inward. Do not cross leg past middle of your body

## 2018-12-23 NOTE — DISCHARGE NOTE ADULT - PATIENT PORTAL LINK FT
You can access the [facility] Portal, offered by [facility], by registering with the following website: [email or web address]

## 2018-12-23 NOTE — CHART NOTE - NSCHARTNOTEFT_GEN_A_CORE
Called by nurse about pt with elevated /77, and HR=97, scheduled to go to OR.    Plan  -hydralazine 5 mg IVP x1  -Monitor BP and consider additional coverage if still elevated

## 2018-12-23 NOTE — DISCHARGE NOTE ADULT - PLAN OF CARE
Return to baseline ADLs 1.	Pain control  2.	Walking with full weight bearing as tolerated, with assistive devices as needed.  3.	DVT prophylaxis  4.	Physical therapy as needed  5.	Follow up with Dr. Souza as outpatient in 10-14 Days after discharge from the hospital or rehab. Call office for appointment.  6.	Remove staples post-op day 14.  7.	Ice and elevate affected area as needed  8.	Keep dressing clean, dry and intact  9.	Posterior hip precautions - Abduction pillow while seated or supine. Do not bend hip past 90 degrees. Do not turn knee/foot inward. Do not cross leg past middle of your body

## 2018-12-23 NOTE — BRIEF OPERATIVE NOTE - PROCEDURE
<<-----Click on this checkbox to enter Procedure Hemiarthroplasty of hip  12/23/2018    Active  [id number]

## 2018-12-23 NOTE — DISCHARGE NOTE ADULT - MEDICATION SUMMARY - MEDICATIONS TO TAKE
I will START or STAY ON the medications listed below when I get home from the hospital:    Tylenol 325 mg oral tablet  -- 2 tab(s) by mouth every 6 hours, As Needed for mild pain  -- Indication: For pain    traMADol 50 mg oral tablet  -- 1 tab(s) by mouth every 6 hours, As Needed for moderate to severe pain  -- Indication: For pain    heparin  -- 5000 unit(s) subcutaneous every 8 hours  -- Indication: For stop after jan 20, 2019 to complete 4 weeks    senna oral tablet  -- 2 tab(s) by mouth once a day (at bedtime)  -- Indication: For Constipation    docusate sodium 100 mg oral capsule  -- 1 cap(s) by mouth 3 times a day  -- Indication: For Constipation    polyethylene glycol 3350 oral powder for reconstitution  -- 17 gram(s) by mouth once a day  -- Indication: For Constipation    Multiple Vitamins oral tablet  -- 1 tab(s) by mouth once a day  -- Indication: For Willis emed

## 2018-12-24 LAB
ANION GAP SERPL CALC-SCNC: 8 MMOL/L — SIGNIFICANT CHANGE UP (ref 5–17)
BUN SERPL-MCNC: 24 MG/DL — HIGH (ref 7–23)
CALCIUM SERPL-MCNC: 7.9 MG/DL — LOW (ref 8.5–10.1)
CHLORIDE SERPL-SCNC: 110 MMOL/L — HIGH (ref 96–108)
CO2 SERPL-SCNC: 23 MMOL/L — SIGNIFICANT CHANGE UP (ref 22–31)
CREAT SERPL-MCNC: 1.29 MG/DL — SIGNIFICANT CHANGE UP (ref 0.5–1.3)
GLUCOSE SERPL-MCNC: 137 MG/DL — HIGH (ref 70–99)
HCT VFR BLD CALC: 37.5 % — SIGNIFICANT CHANGE UP (ref 34.5–45)
HGB BLD-MCNC: 12.6 G/DL — SIGNIFICANT CHANGE UP (ref 11.5–15.5)
MCHC RBC-ENTMCNC: 30.5 PG — SIGNIFICANT CHANGE UP (ref 27–34)
MCHC RBC-ENTMCNC: 33.6 GM/DL — SIGNIFICANT CHANGE UP (ref 32–36)
MCV RBC AUTO: 90.8 FL — SIGNIFICANT CHANGE UP (ref 80–100)
NRBC # BLD: 0 /100 WBCS — SIGNIFICANT CHANGE UP (ref 0–0)
PLATELET # BLD AUTO: 146 K/UL — LOW (ref 150–400)
POTASSIUM SERPL-MCNC: 3.4 MMOL/L — LOW (ref 3.5–5.3)
POTASSIUM SERPL-SCNC: 3.4 MMOL/L — LOW (ref 3.5–5.3)
RBC # BLD: 4.13 M/UL — SIGNIFICANT CHANGE UP (ref 3.8–5.2)
RBC # FLD: 13.2 % — SIGNIFICANT CHANGE UP (ref 10.3–14.5)
SODIUM SERPL-SCNC: 141 MMOL/L — SIGNIFICANT CHANGE UP (ref 135–145)
WBC # BLD: 17.49 K/UL — HIGH (ref 3.8–10.5)
WBC # FLD AUTO: 17.49 K/UL — HIGH (ref 3.8–10.5)

## 2018-12-24 PROCEDURE — 72170 X-RAY EXAM OF PELVIS: CPT | Mod: 26

## 2018-12-24 PROCEDURE — 99232 SBSQ HOSP IP/OBS MODERATE 35: CPT

## 2018-12-24 RX ORDER — POTASSIUM CHLORIDE 20 MEQ
20 PACKET (EA) ORAL
Qty: 0 | Refills: 0 | Status: COMPLETED | OUTPATIENT
Start: 2018-12-24 | End: 2018-12-24

## 2018-12-24 RX ADMIN — Medication 650 MILLIGRAM(S): at 12:03

## 2018-12-24 RX ADMIN — HEPARIN SODIUM 5000 UNIT(S): 5000 INJECTION INTRAVENOUS; SUBCUTANEOUS at 21:56

## 2018-12-24 RX ADMIN — POLYETHYLENE GLYCOL 3350 17 GRAM(S): 17 POWDER, FOR SOLUTION ORAL at 12:02

## 2018-12-24 RX ADMIN — Medication 100 MILLIGRAM(S): at 02:53

## 2018-12-24 RX ADMIN — Medication 650 MILLIGRAM(S): at 17:04

## 2018-12-24 RX ADMIN — Medication 650 MILLIGRAM(S): at 06:10

## 2018-12-24 RX ADMIN — HEPARIN SODIUM 5000 UNIT(S): 5000 INJECTION INTRAVENOUS; SUBCUTANEOUS at 13:30

## 2018-12-24 RX ADMIN — Medication 20 MILLIEQUIVALENT(S): at 09:14

## 2018-12-24 RX ADMIN — Medication 650 MILLIGRAM(S): at 12:02

## 2018-12-24 RX ADMIN — HEPARIN SODIUM 5000 UNIT(S): 5000 INJECTION INTRAVENOUS; SUBCUTANEOUS at 06:09

## 2018-12-24 RX ADMIN — Medication 20 MILLIEQUIVALENT(S): at 12:02

## 2018-12-24 RX ADMIN — Medication 100 MILLIGRAM(S): at 13:30

## 2018-12-24 RX ADMIN — LISINOPRIL 2.5 MILLIGRAM(S): 2.5 TABLET ORAL at 06:10

## 2018-12-24 RX ADMIN — Medication 650 MILLIGRAM(S): at 17:05

## 2018-12-24 NOTE — PHYSICAL THERAPY INITIAL EVALUATION ADULT - RANGE OF MOTION EXAMINATION, REHAB EVAL
L hip WFL within THP/bilateral upper extremity ROM was WFL (within functional limits)/Right LE ROM was WFL (within functional limits)

## 2018-12-24 NOTE — PHYSICAL THERAPY INITIAL EVALUATION ADULT - PERTINENT HX OF CURRENT PROBLEM, REHAB EVAL
87yo/F with PMH dementia, chronic back pain s/p prior laminectomies x2  presented s/p mechanical fall. She was walking to get her robe when turned and lost her balance sustaining fall. She was c/o LT hip pain and inability to ambulate afterwards. Denies LOC. No head trauma.

## 2018-12-24 NOTE — DIETITIAN INITIAL EVALUATION ADULT. - OTHER INFO
pt seen 2/2 low BMI: 16.  87yo female with PMH of dementia and chronic back pain presented s/p  mechanical fall found to have Lt hip Fx s/p hemiarthroplasty of hip on 12/23. Now pending d/c to [facility].  Upon visit, pt appears underwt and thin; BMI 16.  NFPE significant for severe hand, temporal, and clavicle muscle wasting.  severe orbital fat wasting.  Pt unable to provide any history 2/2 dementia.  Breakfast tray observed, only bites consumed.  No wt hx in EMR.  no edema noted.  BM (+) 12/21, on bowel regimen.  jean paul score of 17, no PU.  based on above, pt meets criteria for severe malnutrition in chronic illness (dementia).  labs noted, hypokalemia noted; receiving supplementation.  RECOMMENDATIONS: 1) add ensure enlive TID 2) add MVI with minerals daily to ensure 100% RDI met 3) biweekly wt checks 4) provide maximum assistance/encouragement with all PO intake

## 2018-12-24 NOTE — DIETITIAN INITIAL EVALUATION ADULT. - PERTINENT MEDS FT
MEDICATIONS  (STANDING):  acetaminophen   Tablet .. 650 milliGRAM(s) Oral every 6 hours  docusate sodium 100 milliGRAM(s) Oral three times a day  heparin  Injectable 5000 Unit(s) SubCutaneous every 8 hours  lactated ringers. 1000 milliLiter(s) (70 mL/Hr) IV Continuous <Continuous>  lisinopril 2.5 milliGRAM(s) Oral daily  polyethylene glycol 3350 17 Gram(s) Oral daily  potassium chloride    Tablet ER 20 milliEquivalent(s) Oral every 2 hours    MEDICATIONS  (PRN):  aluminum hydroxide/magnesium hydroxide/simethicone Suspension 30 milliLiter(s) Oral four times a day PRN Indigestion  ondansetron Injectable 4 milliGRAM(s) IV Push every 6 hours PRN Nausea and/or Vomiting  oxyCODONE    IR 5 milliGRAM(s) Oral every 4 hours PRN Severe Pain (7 - 10)  oxyCODONE    IR 2.5 milliGRAM(s) Oral every 4 hours PRN Moderate Pain (4 - 6)  senna 2 Tablet(s) Oral at bedtime PRN Constipation

## 2018-12-24 NOTE — PHYSICAL THERAPY INITIAL EVALUATION ADULT - MODALITIES TREATMENT COMMENTS
Pt was pleasantly confused and needed constant reminders why she was in the hospital and in pain. Pt was educated on THP and therex. Pt was limited due to L hip pain upon standing and unable to advance LLE without manual A. Pt was left sitting in chair  +ABD pillow +bill SCD, w/ call bell and tray in reach and chair alarm secured, RN made aware. Pt left in NAD.

## 2018-12-24 NOTE — CHART NOTE - NSCHARTNOTEFT_GEN_A_CORE
Upon Nutritional Assessment by the Registered Dietitian your patient was determined to meet criteria / has evidence of the following diagnosis/diagnoses:          [ ]  Mild Protein Calorie Malnutrition        [ ]  Moderate Protein Calorie Malnutrition        [x] Severe Protein Calorie Malnutrition        [ ] Unspecified Protein Calorie Malnutrition        [x] Underweight / BMI <19        [ ] Morbid Obesity / BMI > 40      Findings:  Upon visit, pt appears underwt and thin; BMI 16.  NFPE significant for severe hand, temporal, and clavicle muscle wasting.  severe orbital fat wasting.  Pt unable to provide any history 2/2 dementia.  Breakfast tray observed, only bites consumed.  No wt hx in EMR.  no edema noted.  BM (+) 12/21, on bowel regimen.  jean paul score of 17, no PU.  based on above, pt meets criteria for severe malnutrition in chronic illness (dementia).    Findings as based on:  •  Comprehensive nutrition assessment and consultation  •  Calorie counts (nutrient intake analysis)  •  Food acceptance and intake status from observations by staff  •  Follow up  •  Patient education  •  Intervention secondary to interdisciplinary rounds  •   concerns      Treatment:    The following diet has been recommended:  1) add ensure enlive TID   2) add MVI with minerals daily to ensure 100% RDI met   3) biweekly wt checks   4) provide maximum assistance/encouragement with all PO intake    PROVIDER Section:     By signing this assessment you are acknowledging and agree with the diagnosis/diagnoses assigned by the Registered Dietitian    Comments:

## 2018-12-24 NOTE — PHYSICAL THERAPY INITIAL EVALUATION ADULT - GENERAL OBSERVATIONS, REHAB EVAL
Pt received supine in bed +ABD pillow +hawk SCD. Pt was pleasantly confused and was A&O x2 unable to tell us where she was. Pt was in pain but cooperative w/ PT> Pt needs constant reminders why she is in the hospital and why she is in pain.

## 2018-12-24 NOTE — DIETITIAN INITIAL EVALUATION ADULT. - PHYSICAL APPEARANCE
underweight/NFPE significant for severe hand, temporal, and clavicle muscle wasting.  severe orbital fat wasting./other (specify)

## 2018-12-24 NOTE — DIETITIAN INITIAL EVALUATION ADULT. - PERTINENT LABORATORY DATA
12-24 Na141 mmol/L Glu 137 mg/dL<H> K+ 3.4 mmol/L<L> Cr  1.29 mg/dL BUN 24 mg/dL<H> Phos n/a   Alb n/a   PAB n/a

## 2018-12-24 NOTE — PROGRESS NOTE ADULT - ASSESSMENT
I:  This is a86yo/F with PMH dementia, chronic back pain s/p prior laminectomies x2  presented s/p mechanical fall. Pt found to have Displaced impacted angulatedleft femoral neck fracture , now s/p left hip bertrand on 12-23-18.  Pt has high thrombosis risks and requires anticoagulation prophylaxis.         :Heparin 5,000 units sq q8h start 2200 today will consider to switch to Lovenox 40mg sq daily for four weeks post procedure one pt is stable.  cr cl noted today 22.  :daily cbc/bmp  :LE Venodynes  : increase mobility as tolerated  will f/u

## 2018-12-25 LAB
ANION GAP SERPL CALC-SCNC: 9 MMOL/L — SIGNIFICANT CHANGE UP (ref 5–17)
BUN SERPL-MCNC: 42 MG/DL — HIGH (ref 7–23)
CALCIUM SERPL-MCNC: 8.6 MG/DL — SIGNIFICANT CHANGE UP (ref 8.5–10.1)
CHLORIDE SERPL-SCNC: 108 MMOL/L — SIGNIFICANT CHANGE UP (ref 96–108)
CO2 SERPL-SCNC: 20 MMOL/L — LOW (ref 22–31)
CREAT SERPL-MCNC: 2.36 MG/DL — HIGH (ref 0.5–1.3)
GLUCOSE SERPL-MCNC: 127 MG/DL — HIGH (ref 70–99)
HCT VFR BLD CALC: 36.7 % — SIGNIFICANT CHANGE UP (ref 34.5–45)
HGB BLD-MCNC: 12.2 G/DL — SIGNIFICANT CHANGE UP (ref 11.5–15.5)
MCHC RBC-ENTMCNC: 30.1 PG — SIGNIFICANT CHANGE UP (ref 27–34)
MCHC RBC-ENTMCNC: 33.2 GM/DL — SIGNIFICANT CHANGE UP (ref 32–36)
MCV RBC AUTO: 90.6 FL — SIGNIFICANT CHANGE UP (ref 80–100)
NRBC # BLD: 0 /100 WBCS — SIGNIFICANT CHANGE UP (ref 0–0)
PLATELET # BLD AUTO: 138 K/UL — LOW (ref 150–400)
POTASSIUM SERPL-MCNC: 5 MMOL/L — SIGNIFICANT CHANGE UP (ref 3.5–5.3)
POTASSIUM SERPL-SCNC: 5 MMOL/L — SIGNIFICANT CHANGE UP (ref 3.5–5.3)
RBC # BLD: 4.05 M/UL — SIGNIFICANT CHANGE UP (ref 3.8–5.2)
RBC # FLD: 13.6 % — SIGNIFICANT CHANGE UP (ref 10.3–14.5)
SODIUM SERPL-SCNC: 137 MMOL/L — SIGNIFICANT CHANGE UP (ref 135–145)
WBC # BLD: 18.68 K/UL — HIGH (ref 3.8–10.5)
WBC # FLD AUTO: 18.68 K/UL — HIGH (ref 3.8–10.5)

## 2018-12-25 PROCEDURE — 99232 SBSQ HOSP IP/OBS MODERATE 35: CPT

## 2018-12-25 PROCEDURE — 71045 X-RAY EXAM CHEST 1 VIEW: CPT | Mod: 26

## 2018-12-25 RX ORDER — SODIUM CHLORIDE 9 MG/ML
1000 INJECTION INTRAMUSCULAR; INTRAVENOUS; SUBCUTANEOUS
Qty: 0 | Refills: 0 | Status: DISCONTINUED | OUTPATIENT
Start: 2018-12-25 | End: 2018-12-27

## 2018-12-25 RX ADMIN — SODIUM CHLORIDE 75 MILLILITER(S): 9 INJECTION INTRAMUSCULAR; INTRAVENOUS; SUBCUTANEOUS at 15:27

## 2018-12-25 RX ADMIN — HEPARIN SODIUM 5000 UNIT(S): 5000 INJECTION INTRAVENOUS; SUBCUTANEOUS at 22:04

## 2018-12-25 RX ADMIN — LISINOPRIL 2.5 MILLIGRAM(S): 2.5 TABLET ORAL at 05:36

## 2018-12-25 RX ADMIN — HEPARIN SODIUM 5000 UNIT(S): 5000 INJECTION INTRAVENOUS; SUBCUTANEOUS at 05:36

## 2018-12-25 RX ADMIN — Medication 650 MILLIGRAM(S): at 05:37

## 2018-12-25 RX ADMIN — Medication 100 MILLIGRAM(S): at 05:36

## 2018-12-25 RX ADMIN — Medication 650 MILLIGRAM(S): at 05:36

## 2018-12-25 RX ADMIN — HEPARIN SODIUM 5000 UNIT(S): 5000 INJECTION INTRAVENOUS; SUBCUTANEOUS at 15:26

## 2018-12-25 RX ADMIN — ONDANSETRON 4 MILLIGRAM(S): 8 TABLET, FILM COATED ORAL at 12:31

## 2018-12-25 NOTE — PROGRESS NOTE ADULT - ASSESSMENT
86F s/p L hip hemiarthroplasty POD 2  Analgesia  DVT ppx  WBAT LLE with posterior hip precautions  PT/OT  Incentive spirometry  DC planning

## 2018-12-26 LAB
ADD ON TEST-SPECIMEN IN LAB: SIGNIFICANT CHANGE UP
ANION GAP SERPL CALC-SCNC: 6 MMOL/L — SIGNIFICANT CHANGE UP (ref 5–17)
APPEARANCE UR: CLEAR — SIGNIFICANT CHANGE UP
BACTERIA # UR AUTO: NEGATIVE — SIGNIFICANT CHANGE UP
BASOPHILS # BLD AUTO: 0.03 K/UL — SIGNIFICANT CHANGE UP (ref 0–0.2)
BASOPHILS NFR BLD AUTO: 0.2 % — SIGNIFICANT CHANGE UP (ref 0–2)
BILIRUB UR-MCNC: NEGATIVE — SIGNIFICANT CHANGE UP
BUN SERPL-MCNC: 51 MG/DL — HIGH (ref 7–23)
CALCIUM SERPL-MCNC: 8.4 MG/DL — LOW (ref 8.5–10.1)
CHLORIDE SERPL-SCNC: 110 MMOL/L — HIGH (ref 96–108)
CO2 SERPL-SCNC: 23 MMOL/L — SIGNIFICANT CHANGE UP (ref 22–31)
COLOR SPEC: YELLOW — SIGNIFICANT CHANGE UP
COMMENT - URINE: SIGNIFICANT CHANGE UP
CREAT SERPL-MCNC: 2.23 MG/DL — HIGH (ref 0.5–1.3)
DIFF PNL FLD: ABNORMAL
EOSINOPHIL # BLD AUTO: 0.01 K/UL — SIGNIFICANT CHANGE UP (ref 0–0.5)
EOSINOPHIL NFR BLD AUTO: 0.1 % — SIGNIFICANT CHANGE UP (ref 0–6)
EPI CELLS # UR: NEGATIVE — SIGNIFICANT CHANGE UP
GLUCOSE SERPL-MCNC: 111 MG/DL — HIGH (ref 70–99)
GLUCOSE UR QL: 50 MG/DL
HCT VFR BLD CALC: 35.7 % — SIGNIFICANT CHANGE UP (ref 34.5–45)
HGB BLD-MCNC: 11.8 G/DL — SIGNIFICANT CHANGE UP (ref 11.5–15.5)
IMM GRANULOCYTES NFR BLD AUTO: 0.4 % — SIGNIFICANT CHANGE UP (ref 0–1.5)
KETONES UR-MCNC: ABNORMAL
LEUKOCYTE ESTERASE UR-ACNC: NEGATIVE — SIGNIFICANT CHANGE UP
LYMPHOCYTES # BLD AUTO: 1.58 K/UL — SIGNIFICANT CHANGE UP (ref 1–3.3)
LYMPHOCYTES # BLD AUTO: 9 % — LOW (ref 13–44)
MCHC RBC-ENTMCNC: 29.8 PG — SIGNIFICANT CHANGE UP (ref 27–34)
MCHC RBC-ENTMCNC: 33.1 GM/DL — SIGNIFICANT CHANGE UP (ref 32–36)
MCV RBC AUTO: 90.2 FL — SIGNIFICANT CHANGE UP (ref 80–100)
MONOCYTES # BLD AUTO: 0.92 K/UL — HIGH (ref 0–0.9)
MONOCYTES NFR BLD AUTO: 5.2 % — SIGNIFICANT CHANGE UP (ref 2–14)
NEUTROPHILS # BLD AUTO: 14.97 K/UL — HIGH (ref 1.8–7.4)
NEUTROPHILS NFR BLD AUTO: 85.1 % — HIGH (ref 43–77)
NITRITE UR-MCNC: NEGATIVE — SIGNIFICANT CHANGE UP
NRBC # BLD: 0 /100 WBCS — SIGNIFICANT CHANGE UP (ref 0–0)
PH UR: 5 — SIGNIFICANT CHANGE UP (ref 5–8)
PLATELET # BLD AUTO: 176 K/UL — SIGNIFICANT CHANGE UP (ref 150–400)
POTASSIUM SERPL-MCNC: 4.9 MMOL/L — SIGNIFICANT CHANGE UP (ref 3.5–5.3)
POTASSIUM SERPL-SCNC: 4.9 MMOL/L — SIGNIFICANT CHANGE UP (ref 3.5–5.3)
PROT UR-MCNC: 30 MG/DL
RBC # BLD: 3.96 M/UL — SIGNIFICANT CHANGE UP (ref 3.8–5.2)
RBC # FLD: 13.7 % — SIGNIFICANT CHANGE UP (ref 10.3–14.5)
RBC CASTS # UR COMP ASSIST: ABNORMAL /HPF (ref 0–4)
SODIUM SERPL-SCNC: 139 MMOL/L — SIGNIFICANT CHANGE UP (ref 135–145)
SP GR SPEC: 1.01 — SIGNIFICANT CHANGE UP (ref 1.01–1.02)
UROBILINOGEN FLD QL: NEGATIVE MG/DL — SIGNIFICANT CHANGE UP
WBC # BLD: 18.07 K/UL — HIGH (ref 3.8–10.5)
WBC # FLD AUTO: 18.07 K/UL — HIGH (ref 3.8–10.5)
WBC UR QL: SIGNIFICANT CHANGE UP

## 2018-12-26 PROCEDURE — 76770 US EXAM ABDO BACK WALL COMP: CPT | Mod: 26

## 2018-12-26 PROCEDURE — 99232 SBSQ HOSP IP/OBS MODERATE 35: CPT

## 2018-12-26 RX ADMIN — POLYETHYLENE GLYCOL 3350 17 GRAM(S): 17 POWDER, FOR SOLUTION ORAL at 15:31

## 2018-12-26 RX ADMIN — Medication 100 MILLIGRAM(S): at 15:32

## 2018-12-26 RX ADMIN — HEPARIN SODIUM 5000 UNIT(S): 5000 INJECTION INTRAVENOUS; SUBCUTANEOUS at 21:52

## 2018-12-26 RX ADMIN — HEPARIN SODIUM 5000 UNIT(S): 5000 INJECTION INTRAVENOUS; SUBCUTANEOUS at 05:54

## 2018-12-26 RX ADMIN — SODIUM CHLORIDE 75 MILLILITER(S): 9 INJECTION INTRAMUSCULAR; INTRAVENOUS; SUBCUTANEOUS at 05:57

## 2018-12-26 RX ADMIN — OXYCODONE HYDROCHLORIDE 5 MILLIGRAM(S): 5 TABLET ORAL at 21:53

## 2018-12-26 RX ADMIN — Medication 100 MILLIGRAM(S): at 21:53

## 2018-12-26 RX ADMIN — LISINOPRIL 2.5 MILLIGRAM(S): 2.5 TABLET ORAL at 05:51

## 2018-12-26 RX ADMIN — OXYCODONE HYDROCHLORIDE 5 MILLIGRAM(S): 5 TABLET ORAL at 22:22

## 2018-12-26 RX ADMIN — HEPARIN SODIUM 5000 UNIT(S): 5000 INJECTION INTRAVENOUS; SUBCUTANEOUS at 15:30

## 2018-12-26 RX ADMIN — Medication 100 MILLIGRAM(S): at 05:54

## 2018-12-26 NOTE — PROGRESS NOTE ADULT - ASSESSMENT
I:  This is a86yo/F with PMH dementia, chronic back pain s/p prior laminectomies x2  presented s/p mechanical fall. Pt found to have Displaced impacted angulatedleft femoral neck fracture , now s/p left hip bertrand on 12-23-18.  Pt has high thrombosis risks and requires anticoagulation prophylaxis.         :Heparin 5,000 units sq q8h start 2200 today will consider to switch to Lovenox 40mg sq daily for four weeks post procedure one pt is stable.  cr cl noted today 12.8.  :daily cbc/bmp  :LE Venodynes  : increase mobility as tolerated  will f/u I:  This is a86yo/F with PMH dementia, chronic back pain s/p prior laminectomies x2  presented s/p mechanical fall. Pt found to have Displaced impacted angulatedleft femoral neck fracture , now s/p left hip bertrand on 12-23-18.  Pt has high thrombosis risks and requires anticoagulation prophylaxis.         :Heparin 5,000 units sq q8h  will consider to switch to Lovenox 40mg sq daily for four weeks post procedure one pt is stable.  cr cl noted today 12.8.  :daily cbc/bmp  :LE Venodynes  : increase mobility as tolerated  will f/u

## 2018-12-26 NOTE — CHART NOTE - NSCHARTNOTEFT_GEN_A_CORE
Assessment:   *pt s/p mechanical fall with Lt hip Fx s/p Lt hip hemiarthroplasty.  TERENCE.  *pt continues with poor PO intake; consumed 2 spoons of cereal, 1/2 an ensure and almost all of a banana.  Pt ate almost nothing yesterday.  Pt meeting <50% of estimated nutr needs.    encouraged oral supplement intake.   *(+) BM on 12/21, none since admission; on bowel regimen.  no edema noted.  *no new wt since admission.  obtain new wt daily.  *pt continues to meet criteria for severe malnutrition.    Recommendations:  1) add gelatein once daily  2) encourage oral supplement intake between meals  3) add MVI with minerals daily to ensure 100% RDI met  4) daily wt checks      Diet Prescription: Diet, Full Liquid (12-23-18 @ 10:01)  Diet, Regular (12-23-18 @ 22:44)      Wt Hx:  Weight (kg): 45.4 (12-22-18 @ 09:27)        Estimated Needs:   [x] no change since previous assessment  Estimated Energy Needs (30-35 calories/kg):  · Weight  (lbs)  99.2 lb  · Weight (kg)  45 kg  · From (30 marzena/kg)  1350  · To (35 calories/kg)  1575    Other Calculation:  · Other Calculation  Ht. 66 "     Wt. 45.4 Kg       16 BMI       IBW 59  Kg      Pt is at  77 %  IBW    Estimated Protein Needs (1.4-1.8 g/kg):  · Weight  (lbs)  99.2  · Weight (kg)  45 kg  · From (1.4 g/kg)  63  · To (1.8 g/kg)  81    Nutrition Diagnostic #1:  · Nutrition Diagnostic Terminology #1: Nutrient  · Nutrient: Malnutrition  · Etiology: inability to consume sufficient energy 2/2 dementia  · Signs/Symptoms: severe muscle/fat wasting  · Nutrition Intervention: Meals and Snack; Medical Food Supplements  · Meals and Snacks: General/healthful diet; Protein - modified diet  · Medical and Food Supplements: Commercial beverage  · Goal/Expected Outcome: pt meet >80% of estimated nutr needs      Nutrition Diagnosis is [x] ongoing  [ ] resolved [ ] not applicable         New Nutrition Diagnosis: [x] not applicable         Pertinent Medications: MEDICATIONS  (STANDING):  docusate sodium 100 milliGRAM(s) Oral three times a day  heparin  Injectable 5000 Unit(s) SubCutaneous every 8 hours  polyethylene glycol 3350 17 Gram(s) Oral daily  sodium chloride 0.9%. 1000 milliLiter(s) (75 mL/Hr) IV Continuous <Continuous>    MEDICATIONS  (PRN):  aluminum hydroxide/magnesium hydroxide/simethicone Suspension 30 milliLiter(s) Oral four times a day PRN Indigestion  bisacodyl Suppository 10 milliGRAM(s) Rectal daily PRN If no bowel movement by POD#2  ondansetron Injectable 4 milliGRAM(s) IV Push every 6 hours PRN Nausea and/or Vomiting  oxyCODONE    IR 5 milliGRAM(s) Oral every 4 hours PRN Severe Pain (7 - 10)  oxyCODONE    IR 2.5 milliGRAM(s) Oral every 4 hours PRN Moderate Pain (4 - 6)  senna 2 Tablet(s) Oral at bedtime PRN Constipation    Pertinent Labs: 12-26 Na139 mmol/L Glu 111 mg/dL<H> K+ 4.9 mmol/L Cr  2.23 mg/dL<H> BUN 51 mg/dL<H> 12-22 Alb 3.6 g/dL      Skin: jean paul score = 16  no PU    Monitoring and Evaluation:   [x] PO intake/Nutr support infusion [ x ] Tolerance to Nutr [ x ] weights [ x ] labs[ x ] follow up per protocol  [ ] other:

## 2018-12-26 NOTE — PROGRESS NOTE ADULT - ASSESSMENT
86F s/p L hip hemiarthroplasty POD 3  Analgesia  DVT ppx  WBAT LLE with posterior hip precautions  PT/OT  Incentive spirometry  DC planning

## 2018-12-27 VITALS
OXYGEN SATURATION: 100 % | RESPIRATION RATE: 16 BRPM | TEMPERATURE: 98 F | DIASTOLIC BLOOD PRESSURE: 52 MMHG | HEART RATE: 97 BPM | SYSTOLIC BLOOD PRESSURE: 122 MMHG

## 2018-12-27 LAB
ANION GAP SERPL CALC-SCNC: 8 MMOL/L — SIGNIFICANT CHANGE UP (ref 5–17)
BUN SERPL-MCNC: 29 MG/DL — HIGH (ref 7–23)
CALCIUM SERPL-MCNC: 8.4 MG/DL — LOW (ref 8.5–10.1)
CHLORIDE SERPL-SCNC: 110 MMOL/L — HIGH (ref 96–108)
CO2 SERPL-SCNC: 24 MMOL/L — SIGNIFICANT CHANGE UP (ref 22–31)
CREAT SERPL-MCNC: 0.72 MG/DL — SIGNIFICANT CHANGE UP (ref 0.5–1.3)
CULTURE RESULTS: NO GROWTH — SIGNIFICANT CHANGE UP
GLUCOSE SERPL-MCNC: 109 MG/DL — HIGH (ref 70–99)
HCT VFR BLD CALC: 35 % — SIGNIFICANT CHANGE UP (ref 34.5–45)
HGB BLD-MCNC: 11.5 G/DL — SIGNIFICANT CHANGE UP (ref 11.5–15.5)
MCHC RBC-ENTMCNC: 30.3 PG — SIGNIFICANT CHANGE UP (ref 27–34)
MCHC RBC-ENTMCNC: 32.9 GM/DL — SIGNIFICANT CHANGE UP (ref 32–36)
MCV RBC AUTO: 92.1 FL — SIGNIFICANT CHANGE UP (ref 80–100)
NRBC # BLD: 0 /100 WBCS — SIGNIFICANT CHANGE UP (ref 0–0)
PLATELET # BLD AUTO: 169 K/UL — SIGNIFICANT CHANGE UP (ref 150–400)
POTASSIUM SERPL-MCNC: 4.5 MMOL/L — SIGNIFICANT CHANGE UP (ref 3.5–5.3)
POTASSIUM SERPL-SCNC: 4.5 MMOL/L — SIGNIFICANT CHANGE UP (ref 3.5–5.3)
RBC # BLD: 3.8 M/UL — SIGNIFICANT CHANGE UP (ref 3.8–5.2)
RBC # FLD: 14.2 % — SIGNIFICANT CHANGE UP (ref 10.3–14.5)
SODIUM SERPL-SCNC: 142 MMOL/L — SIGNIFICANT CHANGE UP (ref 135–145)
SPECIMEN SOURCE: SIGNIFICANT CHANGE UP
SURGICAL PATHOLOGY FINAL REPORT - CH: SIGNIFICANT CHANGE UP
WBC # BLD: 12.12 K/UL — HIGH (ref 3.8–10.5)
WBC # FLD AUTO: 12.12 K/UL — HIGH (ref 3.8–10.5)

## 2018-12-27 PROCEDURE — 99024 POSTOP FOLLOW-UP VISIT: CPT

## 2018-12-27 PROCEDURE — 99232 SBSQ HOSP IP/OBS MODERATE 35: CPT

## 2018-12-27 PROCEDURE — 70450 CT HEAD/BRAIN W/O DYE: CPT | Mod: 26

## 2018-12-27 RX ORDER — POLYETHYLENE GLYCOL 3350 17 G/17G
17 POWDER, FOR SOLUTION ORAL
Qty: 0 | Refills: 0 | COMMUNITY
Start: 2018-12-27

## 2018-12-27 RX ORDER — DOCUSATE SODIUM 100 MG
1 CAPSULE ORAL
Qty: 0 | Refills: 0 | COMMUNITY
Start: 2018-12-27

## 2018-12-27 RX ORDER — IBUPROFEN 200 MG
1 TABLET ORAL
Qty: 0 | Refills: 0 | COMMUNITY

## 2018-12-27 RX ORDER — HEPARIN SODIUM 5000 [USP'U]/ML
5000 INJECTION INTRAVENOUS; SUBCUTANEOUS
Qty: 0 | Refills: 0 | COMMUNITY
Start: 2018-12-27

## 2018-12-27 RX ORDER — SENNA PLUS 8.6 MG/1
2 TABLET ORAL
Qty: 0 | Refills: 0 | COMMUNITY
Start: 2018-12-27

## 2018-12-27 RX ADMIN — OXYCODONE HYDROCHLORIDE 5 MILLIGRAM(S): 5 TABLET ORAL at 06:41

## 2018-12-27 RX ADMIN — ONDANSETRON 4 MILLIGRAM(S): 8 TABLET, FILM COATED ORAL at 13:02

## 2018-12-27 RX ADMIN — POLYETHYLENE GLYCOL 3350 17 GRAM(S): 17 POWDER, FOR SOLUTION ORAL at 11:17

## 2018-12-27 RX ADMIN — OXYCODONE HYDROCHLORIDE 5 MILLIGRAM(S): 5 TABLET ORAL at 06:11

## 2018-12-27 RX ADMIN — HEPARIN SODIUM 5000 UNIT(S): 5000 INJECTION INTRAVENOUS; SUBCUTANEOUS at 13:05

## 2018-12-27 RX ADMIN — HEPARIN SODIUM 5000 UNIT(S): 5000 INJECTION INTRAVENOUS; SUBCUTANEOUS at 06:11

## 2018-12-27 RX ADMIN — Medication 100 MILLIGRAM(S): at 06:11

## 2018-12-27 NOTE — PROGRESS NOTE ADULT - ASSESSMENT
I:  This is a86yo/F with PMH dementia, chronic back pain s/p prior laminectomies x2  presented s/p mechanical fall. Pt found to have Displaced impacted angulatedleft femoral neck fracture , now s/p left hip bertrand on 12-23-18.  Pt has high thrombosis risks and requires anticoagulation prophylaxis.         cont heparin 5000 units sq q 12h, cr cl 26.6  :daily cbc/bmp  :LE Venodynes  : increase mobility as tolerated  will f/u

## 2018-12-27 NOTE — PROGRESS NOTE ADULT - PROVIDER SPECIALTY LIST ADULT
Anticoag Management
Hospitalist
Orthopedics
Hospitalist

## 2018-12-27 NOTE — PROGRESS NOTE ADULT - REASON FOR ADMISSION
s/p mechanical fall

## 2018-12-27 NOTE — PROGRESS NOTE ADULT - SUBJECTIVE AND OBJECTIVE BOX
CC- s/p mechanical fall     HPI:  87yo/F with PMH dementia, chronic back pain s/p prior laminectomies x2  presented s/p mechanical fall. She was walking to get her robe when turned and lost her balance sustaining fall. She was c/o LT hip pain and inability to ambulate afterwards. Denies LOC. No head trauma. Not on Aspirin or blood thinners. No prior h/o cardiac problems (22 Dec 2018 14:32)    18- s/p LT hip hemiarthroplasty, doing good  18 up in a chair. doing good    Review of system- All 10 systems reviewed and is as per HPI otherwise negative.     Vital Signs Last 24 Hrs  T(C): 37 (24 Dec 2018 05:48), Max: 37.1 (23 Dec 2018 17:23)  T(F): 98.6 (24 Dec 2018 05:48), Max: 98.8 (23 Dec 2018 17:23)  HR: 88 (24 Dec 2018 05:48) (81 - 99)  BP: 138/59 (24 Dec 2018 05:48) (128/72 - 147/69)  BP(mean): --  RR: 16 (24 Dec 2018 05:48) (16 - 18)  SpO2: 98% (24 Dec 2018 05:48) (95% - 100%)    LABS:                        12.6   17.49 )-----------( 146      ( 24 Dec 2018 04:33 )             37.5     24 Dec 2018 04:33    141    |  110    |  24     ----------------------------<  137    3.4     |  23     |  1.29     Ca    7.9        24 Dec 2018 04:33    TPro  7.3    /  Alb  3.6    /  TBili  0.4    /  DBili  x      /  AST  23     /  ALT  20     /  AlkPhos  70     22 Dec 2018 11:52    LIVER FUNCTIONS - ( 22 Dec 2018 11:52 )  Alb: 3.6 g/dL / Pro: 7.3 gm/dL / ALK PHOS: 70 U/L / ALT: 20 U/L / AST: 23 U/L / GGT: x           PT/INR - ( 23 Dec 2018 04:44 )   PT: 12.2 sec;   INR: 1.10 ratio       PTT - ( 23 Dec 2018 04:44 )  PTT:28.7 sec    Urinalysis Basic - ( 22 Dec 2018 11:52 )  Color: Yellow / Appearance: Clear / S.025 / pH: x  Gluc: x / Ketone: Trace  / Bili: Negative / Urobili: Negative mg/dL   Blood: x / Protein: 15 mg/dL / Nitrite: Negative   Leuk Esterase: Trace / RBC: 0-2 /HPF / WBC 0-2   Sq Epi: x / Non Sq Epi: Negative / Bacteria: Occasional    RADIOLOGY & ADDITIONAL TESTS:      PHYSICAL EXAM:  GENERAL: NAD, well-groomed, well-developed  HEAD:  Atraumatic, Normocephalic  EYES: EOMI, PERRLA, conjunctiva and sclera clear  HEENT: Moist mucous membranes  NECK: Supple, No JVD  NERVOUS SYSTEM: Awake, confused  CHEST/LUNG: Clear to auscultation bilaterally; No rales, rhonchi, wheezing, or rubs  HEART: Regular rate and rhythm; No murmurs, rubs, or gallops  ABDOMEN: Soft, Nontender, Nondistended; Bowel sounds present  GENITOURINARY- Voiding, no palpable bladder  EXTREMITIES:  2+ Peripheral Pulses, No clubbing, cyanosis, or edema  MUSCULOSKELTAL- LT hip dressing dry  SKIN-no rash, no lesion    MEDICATIONS  (STANDING):  acetaminophen   Tablet .. 650 milliGRAM(s) Oral every 6 hours  ceFAZolin   IVPB 2000 milliGRAM(s) IV Intermittent every 8 hours  docusate sodium 100 milliGRAM(s) Oral three times a day  heparin  Injectable 5000 Unit(s) SubCutaneous every 8 hours  lactated ringers. 1000 milliLiter(s) (70 mL/Hr) IV Continuous <Continuous>  lisinopril 2.5 milliGRAM(s) Oral daily  polyethylene glycol 3350 17 Gram(s) Oral daily    MEDICATIONS  (PRN):  aluminum hydroxide/magnesium hydroxide/simethicone Suspension 30 milliLiter(s) Oral four times a day PRN Indigestion  fentaNYL    Injectable 50 MICROGram(s) IV Push every 5 minutes PRN Severe Pain (7 - 10)  ondansetron Injectable 4 milliGRAM(s) IV Push once PRN Nausea and/or Vomiting  ondansetron Injectable 4 milliGRAM(s) IV Push every 6 hours PRN Nausea and/or Vomiting  oxyCODONE    IR 5 milliGRAM(s) Oral every 4 hours PRN Severe Pain (7 - 10)  oxyCODONE    IR 2.5 milliGRAM(s) Oral every 4 hours PRN Moderate Pain (4 - 6)  senna 2 Tablet(s) Oral at bedtime PRN Constipation    Assessment/Plan  #S/p mechanical fall with LT hip fracture s/p LT hip hemiarthroplasty  Ortho eval appreciated  PT as tolerated  Pain meds prn  Monitor HH  AC by Heparin SQ  Bowel regimen  Incentive spirometry    #HTN  Started on Lisinopril 2.5mg daily  BP stable    #Dementia  Supportive    #Hypokalemia- replete    #Dispo- PT, likely [facility] on Wednesday. D/w pt, daughter at bedside and ortho team
CC- s/p mechanical fall     HPI:  85yo/F with PMH dementia, chronic back pain s/p prior laminectomies x2  presented s/p mechanical fall. She was walking to get her robe when turned and lost her balance sustaining fall. She was c/o LT hip pain and inability to ambulate afterwards. Denies LOC. No head trauma. Not on Aspirin or blood thinners. No prior h/o cardiac problems . She was admitted with left hip fracture. underwent left hip hemiarthroplasty.   Hospital course notable for TERENCE due to urinary retention/dehydration.     12/27: pt seen and examined earlier today. No acute overnight events. No pain. Not much appetite. Daughter at bedside states pt with word finding difficulty since surgery, somewhat improved but still there. no dysphagia/dysarthria/focal waekness.     Review of system- All 10 systems reviewed and is as per HPI otherwise negative.     Vital Signs Last 24 Hrs  T(C): 36.8 (27 Dec 2018 11:16), Max: 36.9 (27 Dec 2018 05:16)  T(F): 98.2 (27 Dec 2018 11:16), Max: 98.4 (27 Dec 2018 05:16)  HR: 97 (27 Dec 2018 11:16) (96 - 101)  BP: 122/52 (27 Dec 2018 11:16) (122/52 - 153/66)  RR: 16 (27 Dec 2018 11:16) (16 - 16)  SpO2: 100% (27 Dec 2018 11:16) (98% - 100%)  PHYSICAL EXAM:    GENERAL: elderly female sitting up in chair, pleasant, confused.  HEAD:  Normocephalic, atraumatic  EYES: EOMI, PERRLA  HEENT: Moist mucous membranes  NECK: Supple, No JVD  NERVOUS SYSTEM:  confused, non focal.  CHEST/LUNG: Clear to auscultation bilaterally  HEART: s1, s2+, Regular rhythm, tachycardic.   ABDOMEN: Soft, Nontender, Nondistended, Bowel sounds present  GENITOURINARY: Voiding, no palpable bladder  EXTREMITIES:   No clubbing, cyanosis, or edema  MUSCULOSKELETAL- left hip dressing c/d/i.  SKIN-no rash  LABS:                        11.5   12.12 )-----------( 169      ( 27 Dec 2018 05:46 )             35.0     12-27    142  |  110<H>  |  29<H>  ----------------------------<  109<H>  4.5   |  24  |  0.72    Ca    8.4<L>      27 Dec 2018 05:46          MEDICATIONS  (STANDING):  docusate sodium 100 milliGRAM(s) Oral three times a day  heparin  Injectable 5000 Unit(s) SubCutaneous every 8 hours  polyethylene glycol 3350 17 Gram(s) Oral daily  sodium chloride 0.9%. 1000 milliLiter(s) (75 mL/Hr) IV Continuous <Continuous>    MEDICATIONS  (PRN):  aluminum hydroxide/magnesium hydroxide/simethicone Suspension 30 milliLiter(s) Oral four times a day PRN Indigestion  bisacodyl Suppository 10 milliGRAM(s) Rectal daily PRN If no bowel movement by POD#2  ondansetron Injectable 4 milliGRAM(s) IV Push every 6 hours PRN Nausea and/or Vomiting  oxyCODONE    IR 5 milliGRAM(s) Oral every 4 hours PRN Severe Pain (7 - 10)  oxyCODONE    IR 2.5 milliGRAM(s) Oral every 4 hours PRN Moderate Pain (4 - 6)  senna 2 Tablet(s) Oral at bedtime PRN Constipation      Assessment/Plan  86f, PMH AS ABOVE A/W:    #S/p mechanical fall with LT hip fracture   -s/p LT hip hemiarthroplasty  -Pain control  -physical therapy  -incentive spirometry    #HTN  -stable off meds.    #TERENCE/urinary retention:  -marcum placed today.   -renal function improved  -to dc with trixie tov @ rehab.     #Sinus Tachycardia:  -likely due to dehydration +/-pain  -improved    #Leukocytosis:  -likely reactive  -improving    #Metabolic encephalopathy:  -likely related to meds/anesthesia  -ct head neg for acute change    #Dementia  -Supportive  -unable to tolerate aricept in past  -outpt f/u    #Hypokalemia  -repleted    #DVT px.     #Dispo:  dc to rehab today  d/w daughter at bedside.
CC- s/p mechanical fall     HPI:  85yo/F with PMH dementia, chronic back pain s/p prior laminectomies x2  presented s/p mechanical fall. She was walking to get her robe when turned and lost her balance sustaining fall. She was c/o LT hip pain and inability to ambulate afterwards. Denies LOC. No head trauma. Not on Aspirin or blood thinners. No prior h/o cardiac problems . She was admitted with left hip fracture. underwent left hip hemiarthroplasty.   Hospital course notable for TERENCE, persistent leukocytosis.    : pt seen and examined this am. Felt ok. no complaints. denied cough/difficulty urinating. Per d/w RN, pt with 800cc outpt with straight cath.    Review of system- All 10 systems reviewed and is as per HPI otherwise negative.     Vital Signs Last 24 Hrs  T(C): 37.4 (26 Dec 2018 11:55), Max: 37.4 (26 Dec 2018 11:55)  T(F): 99.3 (26 Dec 2018 11:55), Max: 99.3 (26 Dec 2018 11:55)  HR: 106 (26 Dec 2018 11:55) (92 - 108)  BP: 164/68 (26 Dec 2018 11:55) (107/81 - 164/68)  BP(mean): 92 (26 Dec 2018 11:55) (92 - 92)  RR: 16 (26 Dec 2018 11:55) (8 - 20)  SpO2: 100% (26 Dec 2018 11:55) (96% - 100%)    PHYSICAL EXAM:    GENERAL: elderly female sitting up in chair, pleasant, confused.  HEAD:  Normocephalic, atraumatic  EYES: EOMI, PERRLA  HEENT: Moist mucous membranes  NECK: Supple, No JVD  NERVOUS SYSTEM:  confused, non focal.  CHEST/LUNG: Clear to auscultation bilaterally  HEART: s1, s2+, Regular rhythm, tachycardic.   ABDOMEN: Soft, Nontender, Nondistended, Bowel sounds present  GENITOURINARY: Voiding, no palpable bladder  EXTREMITIES:   No clubbing, cyanosis, or edema  MUSCULOSKELETAL- left hip dressing c/d/i.  SKIN-no rash    LABS:                        11.8   18.07 )-----------( 176      ( 26 Dec 2018 05:22 )             35.7         139  |  110<H>  |  51<H>  ----------------------------<  111<H>  4.9   |  23  |  2.23<H>    Ca    8.4<L>      26 Dec 2018 05:22        Urinalysis Basic - ( 25 Dec 2018 08:35 )    Color: Yellow / Appearance: Clear / S.015 / pH: x  Gluc: x / Ketone: Trace  / Bili: Negative / Urobili: Negative mg/dL   Blood: x / Protein: 30 mg/dL / Nitrite: Negative   Leuk Esterase: Negative / RBC: 11-25 /HPF / WBC 0-2   Sq Epi: x / Non Sq Epi: Negative / Bacteria: Negative      MEDICATIONS  (STANDING):  docusate sodium 100 milliGRAM(s) Oral three times a day  heparin  Injectable 5000 Unit(s) SubCutaneous every 8 hours  polyethylene glycol 3350 17 Gram(s) Oral daily  sodium chloride 0.9%. 1000 milliLiter(s) (75 mL/Hr) IV Continuous <Continuous>    MEDICATIONS  (PRN):  aluminum hydroxide/magnesium hydroxide/simethicone Suspension 30 milliLiter(s) Oral four times a day PRN Indigestion  bisacodyl Suppository 10 milliGRAM(s) Rectal daily PRN If no bowel movement by POD#2  ondansetron Injectable 4 milliGRAM(s) IV Push every 6 hours PRN Nausea and/or Vomiting  oxyCODONE    IR 5 milliGRAM(s) Oral every 4 hours PRN Severe Pain (7 - 10)  oxyCODONE    IR 2.5 milliGRAM(s) Oral every 4 hours PRN Moderate Pain (4 - 6)  senna 2 Tablet(s) Oral at bedtime PRN Constipation      Assessment/Plan  86f, PMH AS ABOVE A/W:    #S/p mechanical fall with LT hip fracture   -s/p LT hip hemiarthroplasty  -Pain control  -physical therapy  -incentive spirometry    #HTN  -DC ace-i due to terence (was started here)    #TERENCE:  -bladder scan with 400cc, now s/p straight cath with 800cc outpt  -check renal sono.   -IVF    #Sinus Tachycardia:  -likely due to dehydration +/-pain  -IVF    #Persistent leukocytosis:  -cxr negative  -u/a neg for UTI  -afebrile with no clear source of infn    #Dementia  -Supportive    #Hypokalemia  -repleted    #DVT px.
CC- s/p mechanical fall     HPI:  87yo/F with PMH dementia, chronic back pain s/p prior laminectomies x2  presented s/p mechanical fall. She was walking to get her robe when turned and lost her balance sustaining fall. She was c/o LT hip pain and inability to ambulate afterwards. Denies LOC. No head trauma. Not on Aspirin or blood thinners. No prior h/o cardiac problems (22 Dec 2018 14:32)    18- s/p LT hip hemiarthroplasty, doing good    Review of system- All 10 systems reviewed and is as per HPI otherwise negative.     T(C): 36.6 (18 @ 11:30), Max: 37 (18 @ 01:15)  HR: 82 (18 @ 11:30) (76 - 99)  BP: 136/58 (18 @ 11:30) (126/68 - 167/78)  RR: 16 (18 @ 11:30) (13 - 18)  SpO2: 95% (18 @ 11:30) (94% - 100%)  Wt(kg): --    LABS:                        14.4   19.42 )-----------( 154      ( 23 Dec 2018 11:09 )             43.0         143  |  108  |  25<H>  ----------------------------<  118<H>  4.6   |  28  |  1.07    Ca    8.2<L>      23 Dec 2018 11:09    TPro  7.3  /  Alb  3.6  /  TBili  0.4  /  DBili  x   /  AST  23  /  ALT  20  /  AlkPhos  70  12-    PT/INR - ( 23 Dec 2018 04:44 )   PT: 12.2 sec;   INR: 1.10 ratio       PTT - ( 23 Dec 2018 04:44 )  PTT:28.7 sec    Urinalysis Basic - ( 22 Dec 2018 11:52 )  Color: Yellow / Appearance: Clear / S.025 / pH: x  Gluc: x / Ketone: Trace  / Bili: Negative / Urobili: Negative mg/dL   Blood: x / Protein: 15 mg/dL / Nitrite: Negative   Leuk Esterase: Trace / RBC: 0-2 /HPF / WBC 0-2   Sq Epi: x / Non Sq Epi: Negative / Bacteria: Occasional    RADIOLOGY & ADDITIONAL TESTS:      PHYSICAL EXAM:  GENERAL: NAD, well-groomed, well-developed  HEAD:  Atraumatic, Normocephalic  EYES: EOMI, PERRLA, conjunctiva and sclera clear  HEENT: Moist mucous membranes  NECK: Supple, No JVD  NERVOUS SYSTEM: Awake, confused  CHEST/LUNG: Clear to auscultation bilaterally; No rales, rhonchi, wheezing, or rubs  HEART: Regular rate and rhythm; No murmurs, rubs, or gallops  ABDOMEN: Soft, Nontender, Nondistended; Bowel sounds present  GENITOURINARY- Voiding, no palpable bladder  EXTREMITIES:  2+ Peripheral Pulses, No clubbing, cyanosis, or edema  MUSCULOSKELTAL- LT hip dressing dry  SKIN-no rash, no lesion    MEDICATIONS  (STANDING):  acetaminophen   Tablet .. 650 milliGRAM(s) Oral every 6 hours  ceFAZolin   IVPB 2000 milliGRAM(s) IV Intermittent every 8 hours  docusate sodium 100 milliGRAM(s) Oral three times a day  heparin  Injectable 5000 Unit(s) SubCutaneous every 8 hours  lactated ringers. 1000 milliLiter(s) (70 mL/Hr) IV Continuous <Continuous>  lisinopril 2.5 milliGRAM(s) Oral daily  polyethylene glycol 3350 17 Gram(s) Oral daily    MEDICATIONS  (PRN):  aluminum hydroxide/magnesium hydroxide/simethicone Suspension 30 milliLiter(s) Oral four times a day PRN Indigestion  fentaNYL    Injectable 50 MICROGram(s) IV Push every 5 minutes PRN Severe Pain (7 - 10)  ondansetron Injectable 4 milliGRAM(s) IV Push once PRN Nausea and/or Vomiting  ondansetron Injectable 4 milliGRAM(s) IV Push every 6 hours PRN Nausea and/or Vomiting  oxyCODONE    IR 5 milliGRAM(s) Oral every 4 hours PRN Severe Pain (7 - 10)  oxyCODONE    IR 2.5 milliGRAM(s) Oral every 4 hours PRN Moderate Pain (4 - 6)  senna 2 Tablet(s) Oral at bedtime PRN Constipation    Assessment/Plan  #S/p mechanical fall with LT hip fracture s/p LT hip hemiarthroplasty  Ortho eval appreciated  PT as tolerated  Pain meds prn  Monitor HH  AC by Heparin SQ  Bowel regimen  Incentive spirometry    #HTN  Start on Lisinopril 2.5mg daily    #Dementia  Supportive    #Dispo- PT, likely [facility] on Wednesday. D/w pt, daughter at bedside and ortho team
CHIEF COMPLAINT/Diagnosis: fall/ hip orif/ leukocytosis    SUBJECTIVE: no complaints    REVIEW OF SYSTEMS:    CONSTITUTIONAL: No weakness, fevers or chills  EYES/ENT: No visual changes;  No vertigo or throat pain   NECK: No pain or stiffness  RESPIRATORY: No cough, wheezing, hemoptysis; No shortness of breath  CARDIOVASCULAR: No chest pain or palpitations  GASTROINTESTINAL: No abdominal or epigastric pain. No nausea, vomiting, or hematemesis; No diarrhea or constipation. No melena or hematochezia.  GENITOURINARY: No dysuria, frequency or hematuria  NEUROLOGICAL: No numbness or weakness  SKIN: No itching, burning, rashes, or lesions   All other review of systems is negative unless indicated above    Vital Signs Last 24 Hrs  T(C): 37.5 (25 Dec 2018 05:34), Max: 37.5 (25 Dec 2018 05:34)  T(F): 99.5 (25 Dec 2018 05:34), Max: 99.5 (25 Dec 2018 05:34)  HR: 88 (25 Dec 2018 05:34) (79 - 107)  BP: 143/- (25 Dec 2018 05:34) (119/61 - 149/73)  BP(mean): 46 (25 Dec 2018 05:34) (46 - 46)  RR: 16 (25 Dec 2018 05:34) (16 - 16)  SpO2: 97% (25 Dec 2018 05:34) (96% - 100%)    I&O's Summary      CAPILLARY BLOOD GLUCOSE          PHYSICAL EXAM:    Constitutional: NAD, awake and alert, well-developed  HEENT: PERR, EOMI, Normal Hearing, MMM  Neck: Soft and supple, No LAD, No JVD  Respiratory: Breath sounds are clear bilaterally, No wheezing, rales or rhonchi  Cardiovascular: S1 and S2, regular rate and rhythm, no Murmurs, gallops or rubs  Gastrointestinal: Bowel Sounds present, soft, nontender, nondistended, no guarding, no rebound  Extremities: No peripheral edema  Vascular: 2+ peripheral pulses  Neurological: A/O x 3, no focal deficits  Musculoskeletal: 5/5 strength b/l upper and lower extremities  Skin: No rashes    MEDICATIONS:  MEDICATIONS  (STANDING):  acetaminophen   Tablet .. 650 milliGRAM(s) Oral every 6 hours  docusate sodium 100 milliGRAM(s) Oral three times a day  heparin  Injectable 5000 Unit(s) SubCutaneous every 8 hours  lactated ringers. 1000 milliLiter(s) (70 mL/Hr) IV Continuous <Continuous>  lisinopril 2.5 milliGRAM(s) Oral daily  polyethylene glycol 3350 17 Gram(s) Oral daily      LABS: All Labs Reviewed:                        12.2   18.68 )-----------( 138      ( 25 Dec 2018 06:38 )             36.7     12-25    137  |  108  |  42<H>  ----------------------------<  127<H>  5.0   |  20<L>  |  2.36<H>    Ca    8.6      25 Dec 2018 06:38            Blood Culture: 12-22 @ 11:52  Organism --  Gram Stain Blood -- Gram Stain --  Specimen Source .Urine None  Culture-Blood --        Assessment and Plan:     85yo/F with PMH dementia, chronic back pain s/p prior laminectomies x2  presented s/p mechanical fall. She was walking to get her robe when turned and lost her balance sustaining fall. She was c/o LT hip pain and inability to ambulate afterwards. Denies LOC.   	  86F s/p L hip hemiarthroplasty POD 3  -leukocytosis? >>> chest xray stat, urinalysis and urine culture today; no temp spikes  Analgesia  DVT ppx  WBAT LLE with posterior hip precautions  PT/OT  Incentive spirometry  DC planning
HPI:  85yo/F with PMH dementia, chronic back pain s/p prior laminectomies x2  presented s/p mechanical fall. She was walking to get her robe when turned and lost her balance sustaining fall. She was c/o LT hip pain and inability to ambulate afterwards. Denies LOC. No head trauma. Not on Aspirin or blood thinners. No prior h/o cardiac problems (22 Dec 2018 14:32)      Patient is a 86y old  Female who presents with a chief complaint of s/p mechanical fall (23 Dec 2018 06:45)  s/p left hip bertrand on 18.    Consulted by Dr. Fenrie Souza   for VTE prophylaxis, risk stratification, and anticoagulation management.    PAST MEDICAL & SURGICAL HISTORY:  Dementia  History of lumbar laminectomy    CAPRINI SCORE    AGE RELATED RISK FACTORS                                                       MOBILITY RELATED FACTORS  [ ] Age 41-60 years                                            (1 Point)                  [ ] Bed rest                                                        (1 Point)  [ ] Age: 61-74 years                                           (2 Points)                [ ] Plaster cast                                                   (2 Points)  [ x] Age= 75 years                                              (3 Points)                 [ ] Bed bound for more than 72 hours                   (2 Points)    DISEASE RELATED RISK FACTORS                                               GENDER SPECIFIC FACTORS  [ ] Edema in the lower extremities                       (1 Point)                  [ ] Pregnancy                                                     (1 Point)  [ ] Varicose veins                                               (1 Point)                  [ ] Post-partum < 6 weeks                                   (1 Point)             [ ] BMI > 25 Kg/m2                                            (1 Point)                  [ ] Hormonal therapy  or oral contraception            (1 Point)                 [ ] Sepsis (in the previous month)                        (1 Point)                  [ ] History of pregnancy complications  [ ] Pneumonia or serious lung disease                                               [ ] Unexplained or recurrent                       (1 Point)           (in the previous month)                               (1 Point)  [ ] Abnormal pulmonary function test                     (1 Point)                 SURGERY RELATED RISK FACTORS  [ ] Acute myocardial infarction                              (1 Point)                 [ ]  Section                                            (1 Point)  [ ] Congestive heart failure (in the previous month)  (1 Point)                 [ ] Minor surgery                                                 (1 Point)   [ ] Inflammatory bowel disease                             (1 Point)                 [ ] Arthroscopic surgery                                        (2 Points)  [ ] Central venous access                                    (2 Points)                [ ] General surgery lasting more than 45 minutes   (2 Points)       [ ] Stroke (in the previous month)                          (5 Points)               [ ] Elective arthroplasty                                        (5 Points)            [  ] Malignancy (present or past )                             (2 points)                                                                                                                                    HEMATOLOGY RELATED FACTORS                                                 TRAUMA RELATED RISK FACTORS  [ ] Prior episodes of VTE                                     (3 Points)                 [x ] Fracture of the hip, pelvis, or leg                       (5 Points)  [ ] Positive family history for VTE                         (3 Points)                 [ ] Acute spinal cord injury (in the previous month)  (5 Points)  [ ] Prothrombin 04328 A                                      (3 Points)                 [ ] Paralysis  (less than 1 month)                          (5 Points)  [ ] Factor V Leiden                                             (3 Points)                 [ ] Multiple Trauma within 1 month                         (5 Points)  [ ] Lupus anticoagulants                                     (3 Points)                                                           [ ] Anticardiolipin antibodies                                (3 Points)                                                       [ ] High homocysteine in the blood                      (3 Points)                                             [ ] Other congenital or acquired thrombophilia       (3 Points)                                                [ ] Heparin induced thrombocytopenia                  (3 Points)                                          Total Score [ 8 ]    IMPROVE Bleeding Risk Score: 4.5    Falls Risk:   High ( x )  Mod (  )  Low (  )  cr cl: 33.4  EBL: 100  ml  18 Pt seen at bedside on [facility] daughter present.  Discussed her anticoagulation with heparin for now then will switch to Lovenox once pt is stable.  Daughter v/u of the need and is agreeable.  Pt is alert to person only.   18 Pt seen at bedside on [facility].  Alert to person no changes.   -18 Pt seen at bedside on [facility] no changes.   -18 Pt seen at bedside on [facility] oob in chair.  no changes poss discharge to rehab today.   :  pt seen at bedside, remains confused, prob rehab today     Vital Signs Last 24 Hrs  T(C): 36.9 (18 @ 05:16), Max: 37.4 (18 @ 11:55)  T(F): 98.4 (18 @ 05:16), Max: 99.3 (18 @ 11:55)  HR: 96 (18 @ 05:16) (96 - 106)  BP: 153/66 (18 @ 05:16) (150/61 - 164/68)  BP(mean): 92 (18 @ 11:55) (92 - 92)  RR: 16 (18 @ 05:16) (16 - 16)  SpO2: 98% (18 @ 05:16) (98% - 100%)    FAMILY HISTORY:  Family history of cardiac disorder (Father)    Denies any personal or familial history of clotting or bleeding disorders.    Allergies    aspirin (Unknown)    Intolerances        REVIEW OF SYSTEMS    (  )Fever	     (  )Constipation	(  )SOB				(  )Headache	(  )Dysuria  (  )Chills	     (  )Melena	(  )Dyspnea present on exertion	                    (  )Dizziness                    (  )Polyuria  (  )Nausea	     (  )Hematochezia	(  )Cough			                    (  )Syncope   	(  )Hematuria  (  )Vomiting    (  )Chest Pain	(  )Wheezing			( x )Weakness  (  )Diarrhea     (  )Palpitations	(  )Anorexia			(  )Myalgia    All  review of systems negative except ones indicated above. : Yes      PHYSICAL EXAM:    Constitutional: Appears Well    Neurological: A& O x 1 person    Skin: Warm    Respiratory and Thorax: normal effort; Breath sounds: normal; No rales/wheezing/rhonchi  	  Cardiovascular: S1, S2, regular, NMBR	    Gastrointestinal: BS + x 4Q, nontender	    Genitourinary:  Bladder nondistended, nontender    Musculoskeletal:   General Right:   no muscle/joint tenderness,   normal tone, no joint swelling,   ROM: limited/full	    General Left:   no muscle/joint tenderness,   normal tone, no joint swelling,   ROM: limited/full    Hip:  Left: Dressing CDI;     Lower extrems:   Right: no calf tenderness              negative mary's sign               + pedal pulses    Left:   no calf tenderness              negative mary's sign               + pedal pulses                              11.5   12.12 )-----------( 169      ( 27 Dec 2018 05:46 )             35.0           142  |  110<H>  |  29<H>  ----------------------------<  109<H>  4.5   |  24  |  0.72    Ca    8.4<L>      27 Dec 2018 05:46                                11.8   18.07 )-----------( 176      ( 26 Dec 2018 05:22 )             35.7           139  |  110<H>  |  51<H>  ----------------------------<  111<H>  4.9   |  23  |  2.23<H>    Ca    8.4<L>      26 Dec 2018 05:22                               12.2   18.68 )-----------( 138      ( 25 Dec 2018 06:38 )             36.7       12    137  |  108  |  42<H>  ----------------------------<  127<H>  5.0   |  20<L>  |  2.36<H>    Ca    8.6      25 Dec 2018 06:38                             12.6   17.49 )-----------( 146      ( 24 Dec 2018 04:33 )             37.5           141  |  110<H>  |  24<H>  ----------------------------<  137<H>  3.4<L>   |  23  |  1.29    Ca    7.9<L>      24 Dec 2018 04:33    TPro  7.3  /  Alb  3.6  /  TBili  0.4  /  DBili  x   /  AST  23  /  ALT  20  /  AlkPhos  70  1222      PT/INR - ( 23 Dec 2018 04:44 )   PT: 12.2 sec;   INR: 1.10 ratio         PTT - ( 23 Dec 2018 04:44 )  PTT:28.7 sec                        14.4   19.42 )-----------( 154      ( 23 Dec 2018 11:09 )             43.0           143  |  108  |  25<H>  ----------------------------<  118<H>  4.6   |  28  |  1.07    Ca    8.2<L>      23 Dec 2018 11:09    TPro  7.3  /  Alb  3.6  /  TBili  0.4  /  DBili  x   /  AST  23  /  ALT  20  /  AlkPhos  70  12      PT/INR - ( 23 Dec 2018 04:44 )   PT: 12.2 sec;   INR: 1.10 ratio         PTT - ( 23 Dec 2018 04:44 )  PTT:28.7 sec	  			   Xray Hip w/ Pelvis 2 or 3 Views, Left (.18 @ 12:43) >  Impression:    Displaced impacted angulatedleft femoral neck fracture    MEDICATIONS  (STANDING):  docusate sodium 100 milliGRAM(s) Oral three times a day  heparin  Injectable 5000 Unit(s) SubCutaneous every 8 hours  polyethylene glycol 3350 17 Gram(s) Oral daily  sodium chloride 0.9%. 1000 milliLiter(s) IV Continuous <Continuous>      Vital Signs Last 24 Hrs  T(C): 36.9 (18 @ 05:16), Max: 37.4 (18 @ 11:55)  T(F): 98.4 (18 @ 05:16), Max: 99.3 (18 @ 11:55)  HR: 96 (18 @ 05:16) (96 - 106)  BP: 153/66 (18 @ 05:16) (150/61 - 164/68)  BP(mean): 92 (18 @ 11:55) (92 - 92)  RR: 16 (18 @ 05:16) (16 - 16)  SpO2: 98% (18 @ 05:16) (98% - 100%)    DVT Prophylaxis:  LMWH                   (  )  Heparin SQ           ( x )  Coumadin             (  )  Xarelto                  (  )  Eliquis                   (  )  Venodynes           ( x )  Ambulation          ( x )  UFH                       (  )  Contraindicated  (  )
HPI:  85yo/F with PMH dementia, chronic back pain s/p prior laminectomies x2  presented s/p mechanical fall. She was walking to get her robe when turned and lost her balance sustaining fall. She was c/o LT hip pain and inability to ambulate afterwards. Denies LOC. No head trauma. Not on Aspirin or blood thinners. No prior h/o cardiac problems (22 Dec 2018 14:32)      Patient is a 86y old  Female who presents with a chief complaint of s/p mechanical fall (23 Dec 2018 06:45)  s/p left hip bertrand on 18.    Consulted by Dr. Fernie Souza   for VTE prophylaxis, risk stratification, and anticoagulation management.    PAST MEDICAL & SURGICAL HISTORY:  Dementia  History of lumbar laminectomy    CAPRINI SCORE    AGE RELATED RISK FACTORS                                                       MOBILITY RELATED FACTORS  [ ] Age 41-60 years                                            (1 Point)                  [ ] Bed rest                                                        (1 Point)  [ ] Age: 61-74 years                                           (2 Points)                [ ] Plaster cast                                                   (2 Points)  [ x] Age= 75 years                                              (3 Points)                 [ ] Bed bound for more than 72 hours                   (2 Points)    DISEASE RELATED RISK FACTORS                                               GENDER SPECIFIC FACTORS  [ ] Edema in the lower extremities                       (1 Point)                  [ ] Pregnancy                                                     (1 Point)  [ ] Varicose veins                                               (1 Point)                  [ ] Post-partum < 6 weeks                                   (1 Point)             [ ] BMI > 25 Kg/m2                                            (1 Point)                  [ ] Hormonal therapy  or oral contraception            (1 Point)                 [ ] Sepsis (in the previous month)                        (1 Point)                  [ ] History of pregnancy complications  [ ] Pneumonia or serious lung disease                                               [ ] Unexplained or recurrent                       (1 Point)           (in the previous month)                               (1 Point)  [ ] Abnormal pulmonary function test                     (1 Point)                 SURGERY RELATED RISK FACTORS  [ ] Acute myocardial infarction                              (1 Point)                 [ ]  Section                                            (1 Point)  [ ] Congestive heart failure (in the previous month)  (1 Point)                 [ ] Minor surgery                                                 (1 Point)   [ ] Inflammatory bowel disease                             (1 Point)                 [ ] Arthroscopic surgery                                        (2 Points)  [ ] Central venous access                                    (2 Points)                [ ] General surgery lasting more than 45 minutes   (2 Points)       [ ] Stroke (in the previous month)                          (5 Points)               [ ] Elective arthroplasty                                        (5 Points)            [  ] Malignancy (present or past )                             (2 points)                                                                                                                                    HEMATOLOGY RELATED FACTORS                                                 TRAUMA RELATED RISK FACTORS  [ ] Prior episodes of VTE                                     (3 Points)                 [x ] Fracture of the hip, pelvis, or leg                       (5 Points)  [ ] Positive family history for VTE                         (3 Points)                 [ ] Acute spinal cord injury (in the previous month)  (5 Points)  [ ] Prothrombin 29692 A                                      (3 Points)                 [ ] Paralysis  (less than 1 month)                          (5 Points)  [ ] Factor V Leiden                                             (3 Points)                 [ ] Multiple Trauma within 1 month                         (5 Points)  [ ] Lupus anticoagulants                                     (3 Points)                                                           [ ] Anticardiolipin antibodies                                (3 Points)                                                       [ ] High homocysteine in the blood                      (3 Points)                                             [ ] Other congenital or acquired thrombophilia       (3 Points)                                                [ ] Heparin induced thrombocytopenia                  (3 Points)                                          Total Score [ 8 ]    IMPROVE Bleeding Risk Score: 4.5    Falls Risk:   High ( x )  Mod (  )  Low (  )  cr cl: 33.4  EBL: 100  ml  18 Pt seen at bedside on [facility] daughter present.  Discussed her anticoagulation with heparin for now then will switch to Lovenox once pt is stable.  Daughter v/u of the need and is agreeable.  Pt is alert to person only.   18 Pt audi t bedside on [facility].  Alert to person no changes.     Vital Signs Last 24 Hrs  T(C): 37 (18 @ 05:48), Max: 37.1 (-18 @ 17:23)  T(F): 98.6 (18 @ 05:48), Max: 98.8 (-18 @ 17:23)  HR: 88 (-18 @ 05:48) (81 - 99)  BP: 138/59 (-18 @ 05:48) (128/72 - 147/69)  BP(mean): --  RR: 16 (18 @ 05:48) (16 - 18)  SpO2: 98% (-18 @ 05:48) (95% - 100%)    FAMILY HISTORY:  Family history of cardiac disorder (Father)    Denies any personal or familial history of clotting or bleeding disorders.    Allergies    aspirin (Unknown)    Intolerances        REVIEW OF SYSTEMS    (  )Fever	     (  )Constipation	(  )SOB				(  )Headache	(  )Dysuria  (  )Chills	     (  )Melena	(  )Dyspnea present on exertion	                    (  )Dizziness                    (  )Polyuria  (  )Nausea	     (  )Hematochezia	(  )Cough			                    (  )Syncope   	(  )Hematuria  (  )Vomiting    (  )Chest Pain	(  )Wheezing			( x )Weakness  (  )Diarrhea     (  )Palpitations	(  )Anorexia			(  )Myalgia    All  review of systems negative except ones indicated above. : Yes      PHYSICAL EXAM:    Constitutional: Appears Well    Neurological: A& O x 1 person    Skin: Warm    Respiratory and Thorax: normal effort; Breath sounds: normal; No rales/wheezing/rhonchi  	  Cardiovascular: S1, S2, regular, NMBR	    Gastrointestinal: BS + x 4Q, nontender	    Genitourinary:  Bladder nondistended, nontender    Musculoskeletal:   General Right:   no muscle/joint tenderness,   normal tone, no joint swelling,   ROM: limited/full	    General Left:   no muscle/joint tenderness,   normal tone, no joint swelling,   ROM: limited/full    Hip:  Left: Dressing CDI;     Lower extrems:   Right: no calf tenderness              negative mary's sign               + pedal pulses    Left:   no calf tenderness              negative mary's sign               + pedal pulses                        12.6   17.49 )-----------( 146      ( 24 Dec 2018 04:33 )             37.5       12-    141  |  110<H>  |  24<H>  ----------------------------<  137<H>  3.4<L>   |  23  |  1.29    Ca    7.9<L>      24 Dec 2018 04:33    TPro  7.3  /  Alb  3.6  /  TBili  0.4  /  DBili  x   /  AST  23  /  ALT  20  /  AlkPhos  70  12-22      PT/INR - ( 23 Dec 2018 04:44 )   PT: 12.2 sec;   INR: 1.10 ratio         PTT - ( 23 Dec 2018 04:44 )  PTT:28.7 sec                        14.4   19.42 )-----------( 154      ( 23 Dec 2018 11:09 )             43.0       12-    143  |  108  |  25<H>  ----------------------------<  118<H>  4.6   |  28  |  1.07    Ca    8.2<L>      23 Dec 2018 11:09    TPro  7.3  /  Alb  3.6  /  TBili  0.4  /  DBili  x   /  AST  23  /  ALT  20  /  AlkPhos  70  12-22      PT/INR - ( 23 Dec 2018 04:44 )   PT: 12.2 sec;   INR: 1.10 ratio         PTT - ( 23 Dec 2018 04:44 )  PTT:28.7 sec	  			   Xray Hip w/ Pelvis 2 or 3 Views, Left (12.22.18 @ 12:43) >  Impression:    Displaced impacted angulatedleft femoral neck fracture          MEDICATIONS  (STANDING):  acetaminophen   Tablet .. 650 milliGRAM(s) Oral every 6 hours  docusate sodium 100 milliGRAM(s) Oral three times a day  heparin  Injectable 5000 Unit(s) SubCutaneous every 8 hours  lactated ringers. 1000 milliLiter(s) IV Continuous <Continuous>  lisinopril 2.5 milliGRAM(s) Oral daily  polyethylene glycol 3350 17 Gram(s) Oral daily  potassium chloride    Tablet ER 20 milliEquivalent(s) Oral every 2 hours          DVT Prophylaxis:  LMWH                   (  )  Heparin SQ           ( x )  Coumadin             (  )  Xarelto                  (  )  Eliquis                   (  )  Venodynes           ( x )  Ambulation          ( x )  UFH                       (  )  Contraindicated  (  )
HPI:  87yo/F with PMH dementia, chronic back pain s/p prior laminectomies x2  presented s/p mechanical fall. She was walking to get her robe when turned and lost her balance sustaining fall. She was c/o LT hip pain and inability to ambulate afterwards. Denies LOC. No head trauma. Not on Aspirin or blood thinners. No prior h/o cardiac problems (22 Dec 2018 14:32)      Patient is a 86y old  Female who presents with a chief complaint of s/p mechanical fall (23 Dec 2018 06:45)  s/p left hip bertrand on 18.    Consulted by Dr. Fernie Souza   for VTE prophylaxis, risk stratification, and anticoagulation management.    PAST MEDICAL & SURGICAL HISTORY:  Dementia  History of lumbar laminectomy    CAPRINI SCORE    AGE RELATED RISK FACTORS                                                       MOBILITY RELATED FACTORS  [ ] Age 41-60 years                                            (1 Point)                  [ ] Bed rest                                                        (1 Point)  [ ] Age: 61-74 years                                           (2 Points)                [ ] Plaster cast                                                   (2 Points)  [ x] Age= 75 years                                              (3 Points)                 [ ] Bed bound for more than 72 hours                   (2 Points)    DISEASE RELATED RISK FACTORS                                               GENDER SPECIFIC FACTORS  [ ] Edema in the lower extremities                       (1 Point)                  [ ] Pregnancy                                                     (1 Point)  [ ] Varicose veins                                               (1 Point)                  [ ] Post-partum < 6 weeks                                   (1 Point)             [ ] BMI > 25 Kg/m2                                            (1 Point)                  [ ] Hormonal therapy  or oral contraception            (1 Point)                 [ ] Sepsis (in the previous month)                        (1 Point)                  [ ] History of pregnancy complications  [ ] Pneumonia or serious lung disease                                               [ ] Unexplained or recurrent                       (1 Point)           (in the previous month)                               (1 Point)  [ ] Abnormal pulmonary function test                     (1 Point)                 SURGERY RELATED RISK FACTORS  [ ] Acute myocardial infarction                              (1 Point)                 [ ]  Section                                            (1 Point)  [ ] Congestive heart failure (in the previous month)  (1 Point)                 [ ] Minor surgery                                                 (1 Point)   [ ] Inflammatory bowel disease                             (1 Point)                 [ ] Arthroscopic surgery                                        (2 Points)  [ ] Central venous access                                    (2 Points)                [ ] General surgery lasting more than 45 minutes   (2 Points)       [ ] Stroke (in the previous month)                          (5 Points)               [ ] Elective arthroplasty                                        (5 Points)            [  ] Malignancy (present or past )                             (2 points)                                                                                                                                    HEMATOLOGY RELATED FACTORS                                                 TRAUMA RELATED RISK FACTORS  [ ] Prior episodes of VTE                                     (3 Points)                 [x ] Fracture of the hip, pelvis, or leg                       (5 Points)  [ ] Positive family history for VTE                         (3 Points)                 [ ] Acute spinal cord injury (in the previous month)  (5 Points)  [ ] Prothrombin 16054 A                                      (3 Points)                 [ ] Paralysis  (less than 1 month)                          (5 Points)  [ ] Factor V Leiden                                             (3 Points)                 [ ] Multiple Trauma within 1 month                         (5 Points)  [ ] Lupus anticoagulants                                     (3 Points)                                                           [ ] Anticardiolipin antibodies                                (3 Points)                                                       [ ] High homocysteine in the blood                      (3 Points)                                             [ ] Other congenital or acquired thrombophilia       (3 Points)                                                [ ] Heparin induced thrombocytopenia                  (3 Points)                                          Total Score [ 8 ]    IMPROVE Bleeding Risk Score: 4.5    Falls Risk:   High ( x )  Mod (  )  Low (  )  cr cl: 33.4  EBL: 100  ml  18 Pt seen at bedside on [facility] daughter present.  Discussed her anticoagulation with heparin for now then will switch to Lovenox once pt is stable.  Daughter v/u of the need and is agreeable.  Pt is alert to person only.   18 Pt seen at bedside on [facility].  Alert to person no changes.   --18 Pt seen at bedside on [facility] no changes.     Vital Signs Last 24 Hrs  T(C): 37.5 (-25-18 @ 05:34), Max: 37.5 (12-25-18 @ 05:34)  T(F): 99.5 (-25-18 @ 05:34), Max: 99.5 (12-25-18 @ 05:34)  HR: 88 (--18 @ 05:34) (79 - 107)  BP: 143/- (-25-18 @ 05:34) (119/61 - 149/73)  BP(mean): 46 (-25-18 @ 05:34) (46 - 46)  RR: 16 (-25-18 @ 05:34) (16 - 16)  SpO2: 97% (--18 @ 05:34) (96% - 100%)    FAMILY HISTORY:  Family history of cardiac disorder (Father)    Denies any personal or familial history of clotting or bleeding disorders.    Allergies    aspirin (Unknown)    Intolerances        REVIEW OF SYSTEMS    (  )Fever	     (  )Constipation	(  )SOB				(  )Headache	(  )Dysuria  (  )Chills	     (  )Melena	(  )Dyspnea present on exertion	                    (  )Dizziness                    (  )Polyuria  (  )Nausea	     (  )Hematochezia	(  )Cough			                    (  )Syncope   	(  )Hematuria  (  )Vomiting    (  )Chest Pain	(  )Wheezing			( x )Weakness  (  )Diarrhea     (  )Palpitations	(  )Anorexia			(  )Myalgia    All  review of systems negative except ones indicated above. : Yes      PHYSICAL EXAM:    Constitutional: Appears Well    Neurological: A& O x 1 person    Skin: Warm    Respiratory and Thorax: normal effort; Breath sounds: normal; No rales/wheezing/rhonchi  	  Cardiovascular: S1, S2, regular, NMBR	    Gastrointestinal: BS + x 4Q, nontender	    Genitourinary:  Bladder nondistended, nontender    Musculoskeletal:   General Right:   no muscle/joint tenderness,   normal tone, no joint swelling,   ROM: limited/full	    General Left:   no muscle/joint tenderness,   normal tone, no joint swelling,   ROM: limited/full    Hip:  Left: Dressing CDI;     Lower extrems:   Right: no calf tenderness              negative mary's sign               + pedal pulses    Left:   no calf tenderness              negative mary's sign               + pedal pulses                         12.2   18.68 )-----------( 138      ( 25 Dec 2018 06:38 )             36.7           137  |  108  |  42<H>  ----------------------------<  127<H>  5.0   |  20<L>  |  2.36<H>    Ca    8.6      25 Dec 2018 06:38                             12.6   17.49 )-----------( 146      ( 24 Dec 2018 04:33 )             37.5           141  |  110<H>  |  24<H>  ----------------------------<  137<H>  3.4<L>   |  23  |  1.29    Ca    7.9<L>      24 Dec 2018 04:33    TPro  7.3  /  Alb  3.6  /  TBili  0.4  /  DBili  x   /  AST  23  /  ALT  20  /  AlkPhos  70  12-      PT/INR - ( 23 Dec 2018 04:44 )   PT: 12.2 sec;   INR: 1.10 ratio         PTT - ( 23 Dec 2018 04:44 )  PTT:28.7 sec                        14.4   19.42 )-----------( 154      ( 23 Dec 2018 11:09 )             43.0           143  |  108  |  25<H>  ----------------------------<  118<H>  4.6   |  28  |  1.07    Ca    8.2<L>      23 Dec 2018 11:09    TPro  7.3  /  Alb  3.6  /  TBili  0.4  /  DBili  x   /  AST  23  /  ALT  20  /  AlkPhos  70  12      PT/INR - ( 23 Dec 2018 04:44 )   PT: 12.2 sec;   INR: 1.10 ratio         PTT - ( 23 Dec 2018 04:44 )  PTT:28.7 sec	  			   Xray Hip w/ Pelvis 2 or 3 Views, Left (12.22.18 @ 12:43) >  Impression:    Displaced impacted angulatedleft femoral neck fracture          MEDICATIONS  (STANDING):  acetaminophen   Tablet .. 650 milliGRAM(s) Oral every 6 hours  docusate sodium 100 milliGRAM(s) Oral three times a day  heparin  Injectable 5000 Unit(s) SubCutaneous every 8 hours  lactated ringers. 1000 milliLiter(s) IV Continuous <Continuous>  lisinopril 2.5 milliGRAM(s) Oral daily  polyethylene glycol 3350 17 Gram(s) Oral daily            DVT Prophylaxis:  LMWH                   (  )  Heparin SQ           ( x )  Coumadin             (  )  Xarelto                  (  )  Eliquis                   (  )  Venodynes           ( x )  Ambulation          ( x )  UFH                       (  )  Contraindicated  (  )
HPI:  87yo/F with PMH dementia, chronic back pain s/p prior laminectomies x2  presented s/p mechanical fall. She was walking to get her robe when turned and lost her balance sustaining fall. She was c/o LT hip pain and inability to ambulate afterwards. Denies LOC. No head trauma. Not on Aspirin or blood thinners. No prior h/o cardiac problems (22 Dec 2018 14:32)      Patient is a 86y old  Female who presents with a chief complaint of s/p mechanical fall (23 Dec 2018 06:45)  s/p left hip bertrand on 18.    Consulted by Dr. Fernie Souza   for VTE prophylaxis, risk stratification, and anticoagulation management.    PAST MEDICAL & SURGICAL HISTORY:  Dementia  History of lumbar laminectomy    CAPRINI SCORE    AGE RELATED RISK FACTORS                                                       MOBILITY RELATED FACTORS  [ ] Age 41-60 years                                            (1 Point)                  [ ] Bed rest                                                        (1 Point)  [ ] Age: 61-74 years                                           (2 Points)                [ ] Plaster cast                                                   (2 Points)  [ x] Age= 75 years                                              (3 Points)                 [ ] Bed bound for more than 72 hours                   (2 Points)    DISEASE RELATED RISK FACTORS                                               GENDER SPECIFIC FACTORS  [ ] Edema in the lower extremities                       (1 Point)                  [ ] Pregnancy                                                     (1 Point)  [ ] Varicose veins                                               (1 Point)                  [ ] Post-partum < 6 weeks                                   (1 Point)             [ ] BMI > 25 Kg/m2                                            (1 Point)                  [ ] Hormonal therapy  or oral contraception            (1 Point)                 [ ] Sepsis (in the previous month)                        (1 Point)                  [ ] History of pregnancy complications  [ ] Pneumonia or serious lung disease                                               [ ] Unexplained or recurrent                       (1 Point)           (in the previous month)                               (1 Point)  [ ] Abnormal pulmonary function test                     (1 Point)                 SURGERY RELATED RISK FACTORS  [ ] Acute myocardial infarction                              (1 Point)                 [ ]  Section                                            (1 Point)  [ ] Congestive heart failure (in the previous month)  (1 Point)                 [ ] Minor surgery                                                 (1 Point)   [ ] Inflammatory bowel disease                             (1 Point)                 [ ] Arthroscopic surgery                                        (2 Points)  [ ] Central venous access                                    (2 Points)                [ ] General surgery lasting more than 45 minutes   (2 Points)       [ ] Stroke (in the previous month)                          (5 Points)               [ ] Elective arthroplasty                                        (5 Points)            [  ] Malignancy (present or past )                             (2 points)                                                                                                                                    HEMATOLOGY RELATED FACTORS                                                 TRAUMA RELATED RISK FACTORS  [ ] Prior episodes of VTE                                     (3 Points)                 [x ] Fracture of the hip, pelvis, or leg                       (5 Points)  [ ] Positive family history for VTE                         (3 Points)                 [ ] Acute spinal cord injury (in the previous month)  (5 Points)  [ ] Prothrombin 54455 A                                      (3 Points)                 [ ] Paralysis  (less than 1 month)                          (5 Points)  [ ] Factor V Leiden                                             (3 Points)                 [ ] Multiple Trauma within 1 month                         (5 Points)  [ ] Lupus anticoagulants                                     (3 Points)                                                           [ ] Anticardiolipin antibodies                                (3 Points)                                                       [ ] High homocysteine in the blood                      (3 Points)                                             [ ] Other congenital or acquired thrombophilia       (3 Points)                                                [ ] Heparin induced thrombocytopenia                  (3 Points)                                          Total Score [ 8 ]    IMPROVE Bleeding Risk Score: 4.5    Falls Risk:   High ( x )  Mod (  )  Low (  )  cr cl: 33.4  EBL: 100  ml  1218 Pt seen at bedside on [facility] daughter present.  Discussed her anticoagulation with heparin for now then will switch to Lovenox once pt is stable.  Daughter v/u of the need and is agreeable.  Pt is alert to person only.   12-18 Pt seen at bedside on [facility].  Alert to person no changes.   12--18 Pt seen at bedside on [facility] no changes.   12--18 Pt seen at bedside on [facility] oob in chair.  no changes poss discharge to rehab today.     Vital Signs Last 24 Hrs  T(C): 36.8 (18 @ 05:38), Max: 36.8 (18 @ 05:38)  T(F): 98.2 (18 @ 05:38), Max: 98.2 (18 @ 05:38)  HR: 97 (18 @ 05:38) (83 - 108)  BP: 163/75 (18 @ 05:38) (104/50 - 163/75)  BP(mean): --  RR: 18 (18 @ 05:38) (8 - 20)  SpO2: 99% (18 @ 05:38) (96% - 100%)    FAMILY HISTORY:  Family history of cardiac disorder (Father)    Denies any personal or familial history of clotting or bleeding disorders.    Allergies    aspirin (Unknown)    Intolerances        REVIEW OF SYSTEMS    (  )Fever	     (  )Constipation	(  )SOB				(  )Headache	(  )Dysuria  (  )Chills	     (  )Melena	(  )Dyspnea present on exertion	                    (  )Dizziness                    (  )Polyuria  (  )Nausea	     (  )Hematochezia	(  )Cough			                    (  )Syncope   	(  )Hematuria  (  )Vomiting    (  )Chest Pain	(  )Wheezing			( x )Weakness  (  )Diarrhea     (  )Palpitations	(  )Anorexia			(  )Myalgia    All  review of systems negative except ones indicated above. : Yes      PHYSICAL EXAM:    Constitutional: Appears Well    Neurological: A& O x 1 person    Skin: Warm    Respiratory and Thorax: normal effort; Breath sounds: normal; No rales/wheezing/rhonchi  	  Cardiovascular: S1, S2, regular, NMBR	    Gastrointestinal: BS + x 4Q, nontender	    Genitourinary:  Bladder nondistended, nontender    Musculoskeletal:   General Right:   no muscle/joint tenderness,   normal tone, no joint swelling,   ROM: limited/full	    General Left:   no muscle/joint tenderness,   normal tone, no joint swelling,   ROM: limited/full    Hip:  Left: Dressing CDI;     Lower extrems:   Right: no calf tenderness              negative mary's sign               + pedal pulses    Left:   no calf tenderness              negative mary's sign               + pedal pulses                        11.8   18.07 )-----------( 176      ( 26 Dec 2018 05:22 )             35.7           139  |  110<H>  |  51<H>  ----------------------------<  111<H>  4.9   |  23  |  2.23<H>    Ca    8.4<L>      26 Dec 2018 05:22                               12.2   18.68 )-----------( 138      ( 25 Dec 2018 06:38 )             36.7           137  |  108  |  42<H>  ----------------------------<  127<H>  5.0   |  20<L>  |  2.36<H>    Ca    8.6      25 Dec 2018 06:38                             12.6   17.49 )-----------( 146      ( 24 Dec 2018 04:33 )             37.5           141  |  110<H>  |  24<H>  ----------------------------<  137<H>  3.4<L>   |  23  |  1.29    Ca    7.9<L>      24 Dec 2018 04:33    TPro  7.3  /  Alb  3.6  /  TBili  0.4  /  DBili  x   /  AST  23  /  ALT  20  /  AlkPhos  70  12-22      PT/INR - ( 23 Dec 2018 04:44 )   PT: 12.2 sec;   INR: 1.10 ratio         PTT - ( 23 Dec 2018 04:44 )  PTT:28.7 sec                        14.4   19.42 )-----------( 154      ( 23 Dec 2018 11:09 )             43.0           143  |  108  |  25<H>  ----------------------------<  118<H>  4.6   |  28  |  1.07    Ca    8.2<L>      23 Dec 2018 11:09    TPro  7.3  /  Alb  3.6  /  TBili  0.4  /  DBili  x   /  AST  23  /  ALT  20  /  AlkPhos  70  1222      PT/INR - ( 23 Dec 2018 04:44 )   PT: 12.2 sec;   INR: 1.10 ratio         PTT - ( 23 Dec 2018 04:44 )  PTT:28.7 sec	  			   Xray Hip w/ Pelvis 2 or 3 Views, Left (12.22.18 @ 12:43) >  Impression:    Displaced impacted angulatedleft femoral neck fracture          MEDICATIONS  (STANDING):  acetaminophen   Tablet .. 650 milliGRAM(s) Oral every 6 hours  docusate sodium 100 milliGRAM(s) Oral three times a day  heparin  Injectable 5000 Unit(s) SubCutaneous every 8 hours  lactated ringers. 1000 milliLiter(s) IV Continuous <Continuous>  polyethylene glycol 3350 17 Gram(s) Oral daily  sodium chloride 0.9%. 1000 milliLiter(s) IV Continuous <Continuous>      DVT Prophylaxis:  LMWH                   (  )  Heparin SQ           ( x )  Coumadin             (  )  Xarelto                  (  )  Eliquis                   (  )  Venodynes           ( x )  Ambulation          ( x )  UFH                       (  )  Contraindicated  (  )
Orthopedics Progress Note:    This is a 86yy/o Female s/p Left Hip Hemiarthroplasty POD 4.  Pain is controlled. Pt feeling well. No nausea or vomiting.    Vital Signs Last 24 Hrs  T(C): 36.8 (12-27-18 @ 11:16), Max: 36.9 (12-27-18 @ 05:16)  T(F): 98.2 (12-27-18 @ 11:16), Max: 98.4 (12-27-18 @ 05:16)  HR: 97 (12-27-18 @ 11:16) (96 - 101)  BP: 122/52 (12-27-18 @ 11:16) (122/52 - 153/66)  BP(mean): --  RR: 16 (12-27-18 @ 11:16) (16 - 16)  SpO2: 100% (12-27-18 @ 11:16) (98% - 100%)                        11.5   12.12 )-----------( 169      ( 27 Dec 2018 05:46 )             35.0     27 Dec 2018 05:46    142    |  110    |  29     ----------------------------<  109    4.5     |  24     |  0.72     Ca    8.4        27 Dec 2018 05:46      Exam:  NAD AAOx3.  Dressing with minimal dried blood staining; Dressing removed.   Surgical incision is clean and dry with staples in place.  Abduction pillow and SCDs in place.  Calves are soft and nontender.  Moving all toes, sensation intact.  DP and PT pulses 2+.    A/P:  Stable POD 4 Left Hip Hemiarthroplasty  -Dressing changed; pt tolerated well.   -Analgesia  -Ice application  -DVT PE ppx  -OOB PT WBAT LLE with posterior dislocation precautions  -Abduction pillow  -d/c planning
Patient seen and examined. Pain controlled. Patient discontinued dressing yesterday, which was replaced with a new aquacell.    Physical exam  VS: Afebrile, vital signs stable  Gen: NAD  Left LE: Dressing clean, dry, and intact. +EHL/FHL/TA/GSC. SILT L3-S1. +Dorsalis pedis, capillary refill brisk. Compartments soft and nontender.      86F s/p left hip hemiarthroplasty POD# 1    Weight bear as tolerated with posterior hip precautions  XR pelvis - today, will order later  Pain control  DVT prophylaxis  Abduction while supine/seated  Physical therapy  Discharge planning
Pt S/E at bedside, no acute events overnight, pain controlled. No complaints this morning, Pt feels well.    Vital Signs Last 24 Hrs  T(C): 37.5 (25 Dec 2018 05:34), Max: 37.5 (25 Dec 2018 05:34)  T(F): 99.5 (25 Dec 2018 05:34), Max: 99.5 (25 Dec 2018 05:34)  HR: 88 (25 Dec 2018 05:34) (79 - 107)  BP: 143/- (25 Dec 2018 05:34) (119/61 - 149/73)  BP(mean): 46 (25 Dec 2018 05:34) (46 - 46)  RR: 16 (25 Dec 2018 05:34) (16 - 16)  SpO2: 97% (25 Dec 2018 05:34) (96% - 100%)    Gen: NAD, AAOx3    Left Lower Extremity:  Dressing clean dry intact, +abd pillow  +EHL/FHL/TA/GS  SILT L3-S1  +DP/PT Pulses  Compartments soft  No calf TTP B/L
Pt S/E at bedside, no acute events overnight, pain controlled. Pt had episode of hypertension overnight and received hydralazine. No complaints this morning. Pt pulled out her IV overnight and is awaiting replacement IV.    Vital Signs Last 24 Hrs  T(C): 36.9 (23 Dec 2018 06:39), Max: 37 (23 Dec 2018 01:15)  T(F): 98.4 (23 Dec 2018 06:39), Max: 98.6 (23 Dec 2018 01:15)  HR: 91 (23 Dec 2018 06:39) (82 - 99)  BP: 127/77 (23 Dec 2018 06:39) (127/77 - 167/78)  BP(mean): --  RR: 16 (23 Dec 2018 06:39) (16 - 17)  SpO2: 96% (23 Dec 2018 06:39) (94% - 97%)    Gen: NAD, awake/alert    Left Lower Extremity:  Skin intact  +EHL/FHL/TA/GS  SILT L3-S1  +DP/PT Pulses  Compartments soft  No calf TTP B/L
Pt S/E at bedside, no acute events overnight, pain controlled. Pt required straight cath multiple times yesterday for retention. No complaints this morning.    Vital Signs Last 24 Hrs  T(C): 36.9 (27 Dec 2018 05:16), Max: 37.4 (26 Dec 2018 11:55)  T(F): 98.4 (27 Dec 2018 05:16), Max: 99.3 (26 Dec 2018 11:55)  HR: 96 (27 Dec 2018 05:16) (96 - 106)  BP: 153/66 (27 Dec 2018 05:16) (150/61 - 164/68)  BP(mean): 92 (26 Dec 2018 11:55) (92 - 92)  RR: 16 (27 Dec 2018 05:16) (16 - 18)  SpO2: 98% (27 Dec 2018 05:16) (98% - 100%)    Gen: NAD, awake/alert    Left Lower Extremity:  Dressing clean dry intact, +abd pillow  +EHL/FHL/TA/GS  SILT L3-S1  +DP/PT Pulses  Compartments soft  No calf TTP B/L
Pt S/E at bedside, no acute events overnight, pain controlled    Vital Signs Last 24 Hrs  T(C): 36.8 (26 Dec 2018 05:38), Max: 36.8 (26 Dec 2018 05:38)  T(F): 98.2 (26 Dec 2018 05:38), Max: 98.2 (26 Dec 2018 05:38)  HR: 97 (26 Dec 2018 05:38) (83 - 108)  BP: 163/75 (26 Dec 2018 05:38) (104/50 - 163/75)  BP(mean): --  RR: 18 (26 Dec 2018 05:38) (8 - 20)  SpO2: 99% (26 Dec 2018 05:38) (96% - 100%)    Gen: NAD,    Left Lower Extremity:  Dressing removed, incision well appearing, staples intact no erythema or drainage, new dressing applied  +EHL/FHL/TA/GS  SILT L3-S1  +DP/PT Pulses  Compartments soft  No calf TTP B/L

## 2018-12-27 NOTE — PROGRESS NOTE ADULT - ASSESSMENT
86F s/p L hip hemiarthroplasty POD 4  Analgesia  DVT ppx  WBAT LLE with posterior hip precautions  PT/OT  Incentive spirometry  DC planning to rehab

## 2018-12-31 DIAGNOSIS — E43 UNSPECIFIED SEVERE PROTEIN-CALORIE MALNUTRITION: ICD-10-CM

## 2018-12-31 DIAGNOSIS — N17.9 ACUTE KIDNEY FAILURE, UNSPECIFIED: ICD-10-CM

## 2018-12-31 DIAGNOSIS — F03.90 UNSPECIFIED DEMENTIA WITHOUT BEHAVIORAL DISTURBANCE: ICD-10-CM

## 2018-12-31 DIAGNOSIS — E87.6 HYPOKALEMIA: ICD-10-CM

## 2018-12-31 DIAGNOSIS — R33.9 RETENTION OF URINE, UNSPECIFIED: ICD-10-CM

## 2018-12-31 DIAGNOSIS — D72.829 ELEVATED WHITE BLOOD CELL COUNT, UNSPECIFIED: ICD-10-CM

## 2018-12-31 DIAGNOSIS — S72.002A FRACTURE OF UNSPECIFIED PART OF NECK OF LEFT FEMUR, INITIAL ENCOUNTER FOR CLOSED FRACTURE: ICD-10-CM
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
## 2018-12-31 DIAGNOSIS — Z87.891 PERSONAL HISTORY OF NICOTINE DEPENDENCE: ICD-10-CM

## 2018-12-31 DIAGNOSIS — G93.41 METABOLIC ENCEPHALOPATHY: ICD-10-CM

## 2018-12-31 DIAGNOSIS — Z98.1 ARTHRODESIS STATUS: ICD-10-CM

## 2018-12-31 DIAGNOSIS — E86.0 DEHYDRATION: ICD-10-CM
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
## 2018-12-31 DIAGNOSIS — R03.0 ELEVATED BLOOD-PRESSURE READING, WITHOUT DIAGNOSIS OF HYPERTENSION: ICD-10-CM

## 2019-01-02 PROBLEM — F03.90 UNSPECIFIED DEMENTIA WITHOUT BEHAVIORAL DISTURBANCE: Chronic | Status: ACTIVE | Noted: 2018-12-22

## 2019-01-07 ENCOUNTER — APPOINTMENT (OUTPATIENT)
Dept: ORTHOPEDIC SURGERY | Facility: CLINIC | Age: 84
End: 2019-01-07
Payer: MEDICARE

## 2019-01-07 DIAGNOSIS — Z78.9 OTHER SPECIFIED HEALTH STATUS: ICD-10-CM

## 2019-01-07 DIAGNOSIS — Z86.79 PERSONAL HISTORY OF OTHER DISEASES OF THE CIRCULATORY SYSTEM: ICD-10-CM

## 2019-01-07 DIAGNOSIS — S72.002D FRACTURE OF UNSPECIFIED PART OF NECK OF LEFT FEMUR, SUBSEQUENT ENCOUNTER FOR CLOSED FRACTURE WITH ROUTINE HEALING: ICD-10-CM

## 2019-01-07 DIAGNOSIS — Z86.69 PERSONAL HISTORY OF OTHER DISEASES OF THE NERVOUS SYSTEM AND SENSE ORGANS: ICD-10-CM

## 2019-01-07 DIAGNOSIS — Z87.39 PERSONAL HISTORY OF OTHER DISEASES OF THE MUSCULOSKELETAL SYSTEM AND CONNECTIVE TISSUE: ICD-10-CM

## 2019-01-07 PROCEDURE — 99024 POSTOP FOLLOW-UP VISIT: CPT

## 2019-01-07 RX ORDER — MULTIVITAMIN
TABLET ORAL
Refills: 0 | Status: ACTIVE | COMMUNITY

## 2019-01-07 RX ORDER — NITROFURANTOIN MONOHYDRATE/MACROCRYSTALLINE 25; 75 MG/1; MG/1
CAPSULE ORAL
Refills: 0 | Status: ACTIVE | COMMUNITY

## 2019-01-07 RX ORDER — FUROSEMIDE 80 MG/1
TABLET ORAL
Refills: 0 | Status: ACTIVE | COMMUNITY

## 2019-01-07 NOTE — HISTORY OF PRESENT ILLNESS
[___ Days Post Op] : post op day #[unfilled] [Staples Removed] : staples were removed [5] : the patient reports pain that is 5/10 in severity [Clean/Dry/Intact] : clean, dry and intact [Healed] : healed [Erythema] : not erythematous [Discharge] : absent of discharge [Swelling] : not swollen [Dehiscence] : not dehisced [Neuro Intact] : an unremarkable neurological exam [Vascular Intact] : ~T peripheral vascular exam normal [Negative Gustabo's] : maneuvers demonstrated a negative Gustabo's sign [Hardware in Good Position] : hardware in good position [Good Overall Alignment] : good overall alignment [Fixation Site Stable] : fixation site appears stable [Doing Well] : is doing well [Excellent Pain Control] : has excellent pain control [de-identified] : Left hip hemiarthroplasty.\par DOS 12/23/18 [de-identified] : Pt is a 86 year old  F present in the office 15 days PO from Left hip hemiarthroplasty. Pt notes Her  pain has improved 40% and the swelling has improved 80% as compared to their last visit. Pt notes she currently has UTI and has not been eating and retaining her fluids as much as she should. Pt is accompanied by a female individual. She is using a wheelchair to assist her ambulate. Pt denies any fevers, chills, nausea, and vomiting. No other complaints at this time.\par  [de-identified] : General: Alert and oriented x3. In no acute distress. Pleasant in nature with a normal affect. No apparent respiratory distress. No lymphadenopathy. \par \par L Hip Exam\par The wound is intact. + pain mjt. No evidence of any diastases or dehiscence. No surrounding erythema noted. No fluctuance. The area is warm and well-perfused. The patient is able to wiggle her toes. Neurovascularly intact.\par  [de-identified] : 3V of L hip from outside facility revealed on\par 1/4/19 revealed staples in place. Hardware in great position.  [de-identified] : Patient is doing well s/p 2 weeks post op. Pain is well controlled. No sign of infection.\par  [de-identified] : Pt is a 86 year old  F present in the office 15 days PO from Left hip hemiarthroplasty. I recommended the patient undergo a course of physical therapy for 2-3 times a weeks for a total of 12 weeks. I suggested to the patient that she  work with her  ROM, strengthening, home exercises, and stretching. I advised the patient to utilize ice, NSAIDs PRN, and elevate her  LLE above the level of their heart. She will continue ambulating in the wheelchair. In addition, she will practice hip precautions as directed. Nutrition optimization discussed. The pt is to call me as soon as possible if they notice any worsening pain or symptoms. I will f/u with her 2-3 months prn.\par All questions were answered and the patient verbalized understanding. The patient is in agreement with this treatment plan.\par \par

## 2019-01-07 NOTE — ADDENDUM
[FreeTextEntry1] : Documented by Kathryn Durbin acting as a scribe for Dr. Souza on 01/07/2019 \par \par All medical record entries made by the Scribe were at my, Dr. Slaughter, direction and\par personally dictated by me on 01/07/2019 . I have reviewed the chart and agree that the record\par accurately reflects my personal performance of the history, physical exam, procedure and imaging.\par

## 2019-03-07 ENCOUNTER — APPOINTMENT (OUTPATIENT)
Dept: ORTHOPEDIC SURGERY | Facility: CLINIC | Age: 84
End: 2019-03-07

## 2019-03-13 ENCOUNTER — INPATIENT (INPATIENT)
Facility: HOSPITAL | Age: 84
LOS: 8 days | Discharge: NURSING, REHAB OR LONG-TERM CARE | End: 2019-03-22
Attending: INTERNAL MEDICINE | Admitting: INTERNAL MEDICINE
Payer: MEDICARE

## 2019-03-13 VITALS
HEART RATE: 93 BPM | DIASTOLIC BLOOD PRESSURE: 36 MMHG | WEIGHT: 89.95 LBS | SYSTOLIC BLOOD PRESSURE: 112 MMHG | RESPIRATION RATE: 18 BRPM | TEMPERATURE: 98 F

## 2019-03-13 DIAGNOSIS — Z98.890 OTHER SPECIFIED POSTPROCEDURAL STATES: Chronic | ICD-10-CM

## 2019-03-13 LAB
ALBUMIN SERPL ELPH-MCNC: 2.4 G/DL — LOW (ref 3.3–5)
ALP SERPL-CCNC: 143 U/L — HIGH (ref 40–120)
ALT FLD-CCNC: 84 U/L — HIGH (ref 12–78)
ANION GAP SERPL CALC-SCNC: 9 MMOL/L — SIGNIFICANT CHANGE UP (ref 5–17)
APPEARANCE UR: ABNORMAL
AST SERPL-CCNC: 30 U/L — SIGNIFICANT CHANGE UP (ref 15–37)
BACTERIA # UR AUTO: ABNORMAL
BASOPHILS # BLD AUTO: 0.03 K/UL — SIGNIFICANT CHANGE UP (ref 0–0.2)
BASOPHILS NFR BLD AUTO: 0.2 % — SIGNIFICANT CHANGE UP (ref 0–2)
BILIRUB SERPL-MCNC: 0.5 MG/DL — SIGNIFICANT CHANGE UP (ref 0.2–1.2)
BILIRUB UR-MCNC: NEGATIVE — SIGNIFICANT CHANGE UP
BUN SERPL-MCNC: 28 MG/DL — HIGH (ref 7–23)
CALCIUM SERPL-MCNC: 8.8 MG/DL — SIGNIFICANT CHANGE UP (ref 8.5–10.1)
CHLORIDE SERPL-SCNC: 104 MMOL/L — SIGNIFICANT CHANGE UP (ref 96–108)
CO2 SERPL-SCNC: 27 MMOL/L — SIGNIFICANT CHANGE UP (ref 22–31)
COLOR SPEC: YELLOW — SIGNIFICANT CHANGE UP
CREAT SERPL-MCNC: 0.74 MG/DL — SIGNIFICANT CHANGE UP (ref 0.5–1.3)
DIFF PNL FLD: ABNORMAL
EOSINOPHIL # BLD AUTO: 0.12 K/UL — SIGNIFICANT CHANGE UP (ref 0–0.5)
EOSINOPHIL NFR BLD AUTO: 0.9 % — SIGNIFICANT CHANGE UP (ref 0–6)
EPI CELLS # UR: NEGATIVE — SIGNIFICANT CHANGE UP
GLUCOSE SERPL-MCNC: 129 MG/DL — HIGH (ref 70–99)
GLUCOSE UR QL: NEGATIVE MG/DL — SIGNIFICANT CHANGE UP
HCT VFR BLD CALC: 38.7 % — SIGNIFICANT CHANGE UP (ref 34.5–45)
HGB BLD-MCNC: 12.1 G/DL — SIGNIFICANT CHANGE UP (ref 11.5–15.5)
IMM GRANULOCYTES NFR BLD AUTO: 0.3 % — SIGNIFICANT CHANGE UP (ref 0–1.5)
KETONES UR-MCNC: NEGATIVE — SIGNIFICANT CHANGE UP
LACTATE SERPL-SCNC: 0.9 MMOL/L — SIGNIFICANT CHANGE UP (ref 0.7–2)
LEUKOCYTE ESTERASE UR-ACNC: ABNORMAL
LYMPHOCYTES # BLD AUTO: 2.87 K/UL — SIGNIFICANT CHANGE UP (ref 1–3.3)
LYMPHOCYTES # BLD AUTO: 22.7 % — SIGNIFICANT CHANGE UP (ref 13–44)
MCHC RBC-ENTMCNC: 27.9 PG — SIGNIFICANT CHANGE UP (ref 27–34)
MCHC RBC-ENTMCNC: 31.3 GM/DL — LOW (ref 32–36)
MCV RBC AUTO: 89.4 FL — SIGNIFICANT CHANGE UP (ref 80–100)
MONOCYTES # BLD AUTO: 0.81 K/UL — SIGNIFICANT CHANGE UP (ref 0–0.9)
MONOCYTES NFR BLD AUTO: 6.4 % — SIGNIFICANT CHANGE UP (ref 2–14)
NEUTROPHILS # BLD AUTO: 8.8 K/UL — HIGH (ref 1.8–7.4)
NEUTROPHILS NFR BLD AUTO: 69.5 % — SIGNIFICANT CHANGE UP (ref 43–77)
NITRITE UR-MCNC: POSITIVE
NRBC # BLD: 0 /100 WBCS — SIGNIFICANT CHANGE UP (ref 0–0)
NT-PROBNP SERPL-SCNC: 1889 PG/ML — HIGH (ref 0–450)
PH UR: 8 — SIGNIFICANT CHANGE UP (ref 5–8)
PLATELET # BLD AUTO: 278 K/UL — SIGNIFICANT CHANGE UP (ref 150–400)
POTASSIUM SERPL-MCNC: 4.5 MMOL/L — SIGNIFICANT CHANGE UP (ref 3.5–5.3)
POTASSIUM SERPL-SCNC: 4.5 MMOL/L — SIGNIFICANT CHANGE UP (ref 3.5–5.3)
PROT SERPL-MCNC: 7.1 GM/DL — SIGNIFICANT CHANGE UP (ref 6–8.3)
PROT UR-MCNC: 100 MG/DL
RAPID RVP RESULT: SIGNIFICANT CHANGE UP
RBC # BLD: 4.33 M/UL — SIGNIFICANT CHANGE UP (ref 3.8–5.2)
RBC # FLD: 13.4 % — SIGNIFICANT CHANGE UP (ref 10.3–14.5)
RBC CASTS # UR COMP ASSIST: SIGNIFICANT CHANGE UP /HPF (ref 0–4)
SODIUM SERPL-SCNC: 140 MMOL/L — SIGNIFICANT CHANGE UP (ref 135–145)
SP GR SPEC: 1.01 — SIGNIFICANT CHANGE UP (ref 1.01–1.02)
TROPONIN I SERPL-MCNC: <0.015 NG/ML — SIGNIFICANT CHANGE UP (ref 0.01–0.04)
UROBILINOGEN FLD QL: NEGATIVE MG/DL — SIGNIFICANT CHANGE UP
WBC # BLD: 12.67 K/UL — HIGH (ref 3.8–10.5)
WBC # FLD AUTO: 12.67 K/UL — HIGH (ref 3.8–10.5)
WBC UR QL: >50

## 2019-03-13 PROCEDURE — 71045 X-RAY EXAM CHEST 1 VIEW: CPT | Mod: 26

## 2019-03-13 PROCEDURE — 93010 ELECTROCARDIOGRAM REPORT: CPT | Mod: 76

## 2019-03-13 PROCEDURE — 70450 CT HEAD/BRAIN W/O DYE: CPT | Mod: 26

## 2019-03-13 PROCEDURE — 71250 CT THORAX DX C-: CPT | Mod: 26

## 2019-03-13 PROCEDURE — 99285 EMERGENCY DEPT VISIT HI MDM: CPT

## 2019-03-13 RX ORDER — ALBUTEROL 90 UG/1
1 AEROSOL, METERED ORAL EVERY 4 HOURS
Qty: 0 | Refills: 0 | Status: DISCONTINUED | OUTPATIENT
Start: 2019-03-13 | End: 2019-03-22

## 2019-03-13 RX ORDER — SENNA PLUS 8.6 MG/1
2 TABLET ORAL AT BEDTIME
Qty: 0 | Refills: 0 | Status: DISCONTINUED | OUTPATIENT
Start: 2019-03-13 | End: 2019-03-22

## 2019-03-13 RX ORDER — TRAMADOL HYDROCHLORIDE 50 MG/1
1 TABLET ORAL
Qty: 0 | Refills: 0 | COMMUNITY

## 2019-03-13 RX ORDER — MIRTAZAPINE 45 MG/1
7.5 TABLET, ORALLY DISINTEGRATING ORAL AT BEDTIME
Qty: 0 | Refills: 0 | Status: DISCONTINUED | OUTPATIENT
Start: 2019-03-13 | End: 2019-03-16

## 2019-03-13 RX ORDER — CEFEPIME 1 G/1
2000 INJECTION, POWDER, FOR SOLUTION INTRAMUSCULAR; INTRAVENOUS ONCE
Qty: 0 | Refills: 0 | Status: COMPLETED | OUTPATIENT
Start: 2019-03-13 | End: 2019-03-13

## 2019-03-13 RX ORDER — ACETAMINOPHEN 500 MG
1 TABLET ORAL
Qty: 0 | Refills: 0 | COMMUNITY

## 2019-03-13 RX ORDER — ACETAMINOPHEN 500 MG
2 TABLET ORAL
Qty: 0 | Refills: 0 | COMMUNITY

## 2019-03-13 RX ORDER — CEFEPIME 1 G/1
1000 INJECTION, POWDER, FOR SOLUTION INTRAMUSCULAR; INTRAVENOUS EVERY 8 HOURS
Qty: 0 | Refills: 0 | Status: DISCONTINUED | OUTPATIENT
Start: 2019-03-13 | End: 2019-03-13

## 2019-03-13 RX ORDER — VANCOMYCIN HCL 1 G
1000 VIAL (EA) INTRAVENOUS ONCE
Qty: 0 | Refills: 0 | Status: DISCONTINUED | OUTPATIENT
Start: 2019-03-13 | End: 2019-03-13

## 2019-03-13 RX ORDER — NITROGLYCERIN 6.5 MG
0.4 CAPSULE, EXTENDED RELEASE ORAL ONCE
Qty: 0 | Refills: 0 | Status: DISCONTINUED | OUTPATIENT
Start: 2019-03-13 | End: 2019-03-13

## 2019-03-13 RX ORDER — ACETAMINOPHEN 500 MG
500 TABLET ORAL EVERY 8 HOURS
Qty: 0 | Refills: 0 | Status: DISCONTINUED | OUTPATIENT
Start: 2019-03-13 | End: 2019-03-22

## 2019-03-13 RX ORDER — CEFEPIME 1 G/1
1000 INJECTION, POWDER, FOR SOLUTION INTRAMUSCULAR; INTRAVENOUS EVERY 8 HOURS
Qty: 0 | Refills: 0 | Status: DISCONTINUED | OUTPATIENT
Start: 2019-03-13 | End: 2019-03-14

## 2019-03-13 RX ORDER — TIOTROPIUM BROMIDE 18 UG/1
1 CAPSULE ORAL; RESPIRATORY (INHALATION) DAILY
Qty: 0 | Refills: 0 | Status: DISCONTINUED | OUTPATIENT
Start: 2019-03-13 | End: 2019-03-22

## 2019-03-13 RX ORDER — IPRATROPIUM/ALBUTEROL SULFATE 18-103MCG
3 AEROSOL WITH ADAPTER (GRAM) INHALATION EVERY 6 HOURS
Qty: 0 | Refills: 0 | Status: DISCONTINUED | OUTPATIENT
Start: 2019-03-13 | End: 2019-03-22

## 2019-03-13 RX ORDER — VANCOMYCIN HCL 1 G
750 VIAL (EA) INTRAVENOUS ONCE
Qty: 0 | Refills: 0 | Status: COMPLETED | OUTPATIENT
Start: 2019-03-13 | End: 2019-03-13

## 2019-03-13 RX ORDER — VANCOMYCIN HCL 1 G
1000 VIAL (EA) INTRAVENOUS EVERY 12 HOURS
Qty: 0 | Refills: 0 | Status: DISCONTINUED | OUTPATIENT
Start: 2019-03-13 | End: 2019-03-14

## 2019-03-13 RX ADMIN — CEFEPIME 100 MILLIGRAM(S): 1 INJECTION, POWDER, FOR SOLUTION INTRAMUSCULAR; INTRAVENOUS at 23:38

## 2019-03-13 RX ADMIN — SENNA PLUS 2 TABLET(S): 8.6 TABLET ORAL at 23:38

## 2019-03-13 RX ADMIN — Medication 500 MILLIGRAM(S): at 21:40

## 2019-03-13 RX ADMIN — CEFEPIME 1000 MILLIGRAM(S): 1 INJECTION, POWDER, FOR SOLUTION INTRAMUSCULAR; INTRAVENOUS at 23:38

## 2019-03-13 RX ADMIN — Medication 3 MILLILITER(S): at 21:58

## 2019-03-13 RX ADMIN — Medication 250 MILLIGRAM(S): at 21:58

## 2019-03-13 RX ADMIN — MIRTAZAPINE 7.5 MILLIGRAM(S): 45 TABLET, ORALLY DISINTEGRATING ORAL at 23:38

## 2019-03-13 RX ADMIN — Medication 250 MILLIGRAM(S): at 22:30

## 2019-03-13 NOTE — ED CLERICAL - NS ED CLERK NOTE PRE-ARRIVAL INFORMATION; ADDITIONAL PRE-ARRIVAL INFORMATION
This patient is enrolled in the comprehensive joint replacement (CJR) program and has active care navigation.  This patient can be followed up by the care navigation team within 24 hours. To arrange close follow-up or to obtain additional clinical information about this patient, please call the contact number above. Please call the hospitalist for medical consultation ([phone]) PRIOR to admission or observation.  The hospitalist is part of the care team and can assist in acute medical management. Please call the orthopedic team () for ALL patients who require admission.

## 2019-03-13 NOTE — ED PROVIDER NOTE - PROGRESS NOTE DETAILS
Alex Escalera for attending Dr. 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 Cardiac workup started. EKG and pt presented to Dr. Skelton who will come see pt. AJM: seen by dr palmer. no cath at this time. + pna on cxr. abx ordered. signed out for admission to dr dasilva

## 2019-03-13 NOTE — H&P ADULT - ASSESSMENT
86 y/o female    sob/cough, weakness, fever and leukocytosis with b/l consolidation on ct chest, consistent with HCAP,   -admit to med-surg   -cont vanco/cefepime   -duoneb q6h and albuterol prn   -pulm consult   -check am labs   -check rvp  -antiemetics as needed     hypoalbuminemia, poor po intake   -nutrition consult        dvt prophylaxis   -lovenox sc 86 y/o female    sob/cough, weakness, fever and leukocytosis with b/l consolidation on ct chest, consistent with HCAP,   -admit to med-surg   -cont vanco/cefepime   -duoneb q6h and albuterol prn   -pulm consult   -check am labs   -check rvp  -antiemetics as needed     t wave inversions on ecg, d/w cardiology-no plan for cath at this time,   -    hypoalbuminemia, poor po intake   -nutrition consult    dvt prophylaxis   -lovenox sc 88 y/o female    sob/cough, weakness, fever and leukocytosis with b/l consolidation on ct chest, consistent with HCAP,   -admit to med-surg   -cont vanco/cefepime   -duoneb q6h and albuterol prn   -pulm consult   -check am labs   -check rvp  -antiemetics as needed     t wave inversions on ecg, d/w cardiology-no plan for cath at this time,   -case d/w cardiology, recommend holding asa/plavix, statin, bb  -trend troponin     hypoalbuminemia, poor po intake   -nutrition consult    dvt prophylaxis   -lovenox sc

## 2019-03-13 NOTE — ED PROVIDER NOTE - CLINICAL SUMMARY MEDICAL DECISION MAKING FREE TEXT BOX
pt presents with worsening deconditioning. EKG shows significant T wave inversions. Concern for cardiac ischemia. Will do cardiac workup and admit.

## 2019-03-13 NOTE — H&P ADULT - HISTORY OF PRESENT ILLNESS
88 y/o female with h/o dementia, chronic back pain, left foot drop presents from [facility] due to weakness and sob.  associated with fever, 101F, decreased po intake.  much of history provided by daughter and chart due to dementia.  states patient hasnt been progressing well with PT after hip replacement.   is also admitted in [facility] for similar symptoms         In ED, noted to have b/l pna. s/p vanco/cefepime.      PAST MEDICAL HISTORY:  dementia, left foot drop  PAST SURGICAL HISTORY: laminectomyx2, right hip surgery  FAMILY HISTORY:   denies 86 y/o female with h/o dementia, chronic back pain, left foot drop presents from [facility] due to weakness and sob.  associated with fever, 101F, decreased po intake.  much of history provided by daughter and chart due to dementia.  states patient hasnt been progressing well with PT after hip replacement.   is also admitted in [facility] for similar symptoms         In ED, noted to have b/l pna. s/p vanco/cefepime.      PAST MEDICAL HISTORY:  dementia, right foot drop  PAST SURGICAL HISTORY: laminectomyx2, left hip surgery  FAMILY HISTORY:   denies

## 2019-03-13 NOTE — H&P ADULT - NSHPPHYSICALEXAM_GEN_ALL_CORE
PHYSICAL EXAM:    GENERAL: NAD, Alert & Orientied to person and place, frail, ill-appearing  HEENT: dry mucous membranes  HEAD:  Atraumatic, Normocephalic  EYES: EOMI, PERRLA, Conjunctiva and sclera clear  NECK: Supple, No JVD, No lymphadenopathy  CHEST/LUNG: corase breasth sounds bilaterally  HEART: Regular rate and rhythm; No murmurs, rubs, or gallops  ABDOMEN: Soft, Non-tender, Non-distended; Bowel sounds present  GENITOURINARY- Voiding, No palpable bladder  EXTREMITIES:  2+ Peripheral Pulses, No clubbing, cyanosis, or edema  MUSCULOSKELTAL- No muscle tenderness, Muscle tone normal, No joint tenderness, no Joint swelling, FROM  SKIN: No rash, No lesion  NEURO: CN II-XII intact, Motor Strength- 5/5 B/L upper and lower extremities; DTRs 2+ intact and symmetric

## 2019-03-13 NOTE — ED PROVIDER NOTE - OBJECTIVE STATEMENT
88 y/o female with a PMHx of dementia, s/p recent left hip replacement BIBA from [facility] for worsening weakness and deconditioning. Daughter notes intermittent fevers and persistent cough x weeks. Pt was just admitted for PNA. Pt denies any complaints of CP, SOB, n/v. Daughter notes pt is at her baseline mental status where she is occasionally confused. No trauma or changes in medications.

## 2019-03-13 NOTE — ED PROVIDER NOTE - CONSTITUTIONAL, MLM
normal... Well appearing, well nourished, awake, alert, oriented to self only and in no apparent distress.

## 2019-03-14 LAB
ALBUMIN SERPL ELPH-MCNC: 2.1 G/DL — LOW (ref 3.3–5)
ALP SERPL-CCNC: 115 U/L — SIGNIFICANT CHANGE UP (ref 40–120)
ALT FLD-CCNC: 57 U/L — SIGNIFICANT CHANGE UP (ref 12–78)
ANION GAP SERPL CALC-SCNC: 10 MMOL/L — SIGNIFICANT CHANGE UP (ref 5–17)
AST SERPL-CCNC: 20 U/L — SIGNIFICANT CHANGE UP (ref 15–37)
BASOPHILS # BLD AUTO: 0.02 K/UL — SIGNIFICANT CHANGE UP (ref 0–0.2)
BASOPHILS NFR BLD AUTO: 0.2 % — SIGNIFICANT CHANGE UP (ref 0–2)
BILIRUB SERPL-MCNC: 0.5 MG/DL — SIGNIFICANT CHANGE UP (ref 0.2–1.2)
BUN SERPL-MCNC: 22 MG/DL — SIGNIFICANT CHANGE UP (ref 7–23)
CALCIUM SERPL-MCNC: 8.3 MG/DL — LOW (ref 8.5–10.1)
CHLORIDE SERPL-SCNC: 104 MMOL/L — SIGNIFICANT CHANGE UP (ref 96–108)
CO2 SERPL-SCNC: 23 MMOL/L — SIGNIFICANT CHANGE UP (ref 22–31)
CREAT SERPL-MCNC: 0.57 MG/DL — SIGNIFICANT CHANGE UP (ref 0.5–1.3)
EOSINOPHIL # BLD AUTO: 0.12 K/UL — SIGNIFICANT CHANGE UP (ref 0–0.5)
EOSINOPHIL NFR BLD AUTO: 1.1 % — SIGNIFICANT CHANGE UP (ref 0–6)
GLUCOSE SERPL-MCNC: 129 MG/DL — HIGH (ref 70–99)
HCT VFR BLD CALC: 32.4 % — LOW (ref 34.5–45)
HGB BLD-MCNC: 10.4 G/DL — LOW (ref 11.5–15.5)
IMM GRANULOCYTES NFR BLD AUTO: 0.4 % — SIGNIFICANT CHANGE UP (ref 0–1.5)
LYMPHOCYTES # BLD AUTO: 2.63 K/UL — SIGNIFICANT CHANGE UP (ref 1–3.3)
LYMPHOCYTES # BLD AUTO: 23.1 % — SIGNIFICANT CHANGE UP (ref 13–44)
MAGNESIUM SERPL-MCNC: 1.8 MG/DL — SIGNIFICANT CHANGE UP (ref 1.6–2.6)
MCHC RBC-ENTMCNC: 28.3 PG — SIGNIFICANT CHANGE UP (ref 27–34)
MCHC RBC-ENTMCNC: 32.1 GM/DL — SIGNIFICANT CHANGE UP (ref 32–36)
MCV RBC AUTO: 88 FL — SIGNIFICANT CHANGE UP (ref 80–100)
MONOCYTES # BLD AUTO: 0.85 K/UL — SIGNIFICANT CHANGE UP (ref 0–0.9)
MONOCYTES NFR BLD AUTO: 7.5 % — SIGNIFICANT CHANGE UP (ref 2–14)
NEUTROPHILS # BLD AUTO: 7.71 K/UL — HIGH (ref 1.8–7.4)
NEUTROPHILS NFR BLD AUTO: 67.7 % — SIGNIFICANT CHANGE UP (ref 43–77)
NRBC # BLD: 0 /100 WBCS — SIGNIFICANT CHANGE UP (ref 0–0)
PHOSPHATE SERPL-MCNC: 3 MG/DL — SIGNIFICANT CHANGE UP (ref 2.5–4.5)
PLATELET # BLD AUTO: 246 K/UL — SIGNIFICANT CHANGE UP (ref 150–400)
POTASSIUM SERPL-MCNC: 3.9 MMOL/L — SIGNIFICANT CHANGE UP (ref 3.5–5.3)
POTASSIUM SERPL-SCNC: 3.9 MMOL/L — SIGNIFICANT CHANGE UP (ref 3.5–5.3)
PROT SERPL-MCNC: 6.4 GM/DL — SIGNIFICANT CHANGE UP (ref 6–8.3)
RBC # BLD: 3.68 M/UL — LOW (ref 3.8–5.2)
RBC # FLD: 13.3 % — SIGNIFICANT CHANGE UP (ref 10.3–14.5)
SODIUM SERPL-SCNC: 137 MMOL/L — SIGNIFICANT CHANGE UP (ref 135–145)
WBC # BLD: 11.38 K/UL — HIGH (ref 3.8–10.5)
WBC # FLD AUTO: 11.38 K/UL — HIGH (ref 3.8–10.5)

## 2019-03-14 RX ORDER — CEFEPIME 1 G/1
1000 INJECTION, POWDER, FOR SOLUTION INTRAMUSCULAR; INTRAVENOUS EVERY 12 HOURS
Qty: 0 | Refills: 0 | Status: DISCONTINUED | OUTPATIENT
Start: 2019-03-14 | End: 2019-03-21

## 2019-03-14 RX ORDER — CEFEPIME 1 G/1
1000 INJECTION, POWDER, FOR SOLUTION INTRAMUSCULAR; INTRAVENOUS EVERY 12 HOURS
Qty: 0 | Refills: 0 | Status: DISCONTINUED | OUTPATIENT
Start: 2019-03-14 | End: 2019-03-14

## 2019-03-14 RX ORDER — AZITHROMYCIN 500 MG/1
500 TABLET, FILM COATED ORAL DAILY
Qty: 0 | Refills: 0 | Status: DISCONTINUED | OUTPATIENT
Start: 2019-03-14 | End: 2019-03-18

## 2019-03-14 RX ADMIN — Medication 3 MILLILITER(S): at 07:40

## 2019-03-14 RX ADMIN — Medication 3 MILLILITER(S): at 02:16

## 2019-03-14 RX ADMIN — Medication 3 MILLILITER(S): at 21:26

## 2019-03-14 RX ADMIN — Medication 3 MILLILITER(S): at 13:45

## 2019-03-14 RX ADMIN — AZITHROMYCIN 500 MILLIGRAM(S): 500 TABLET, FILM COATED ORAL at 13:41

## 2019-03-14 RX ADMIN — MIRTAZAPINE 7.5 MILLIGRAM(S): 45 TABLET, ORALLY DISINTEGRATING ORAL at 21:30

## 2019-03-14 RX ADMIN — SENNA PLUS 2 TABLET(S): 8.6 TABLET ORAL at 21:30

## 2019-03-14 RX ADMIN — CEFEPIME 1000 MILLIGRAM(S): 1 INJECTION, POWDER, FOR SOLUTION INTRAMUSCULAR; INTRAVENOUS at 17:32

## 2019-03-14 RX ADMIN — Medication 250 MILLIGRAM(S): at 05:57

## 2019-03-14 RX ADMIN — CEFEPIME 1000 MILLIGRAM(S): 1 INJECTION, POWDER, FOR SOLUTION INTRAMUSCULAR; INTRAVENOUS at 05:58

## 2019-03-14 NOTE — DIETITIAN INITIAL EVALUATION ADULT. - PERTINENT LABORATORY DATA
03-14 Na137 mmol/L Glu 129 mg/dL<H> K+ 3.9 mmol/L Cr  0.57 mg/dL BUN 22 mg/dL Phos 3.0 mg/dL Alb 2.1 g/dL<L> PAB n/a

## 2019-03-14 NOTE — CONSULT NOTE ADULT - ASSESSMENT
86 y/o female with h/o dementia, chronic back pain, left foot drop, left hip surgery, laminectomy was admitted on 3/13 from [facility] for increased weakness, SOB, fever to 101F, decreased PO intake. The patient had a recent left hip replacement undergoing PT therapy. In ER, she received cefepime and vancomycin IV.    1. Low grade fever. Cough. B/l Lower lobes pneumonia. ?viral vs bacterial etiology. Pyuria. Probable UTI.  -mild leukocytosis  -obtain BC x 2, urine c/s  -start cefepime 1 gm IV q12h and azithromycin 500 mg PO qd  -reason for abx use and side effects reviewed with patient; monitor BMP   -respiratory carte  -old chart reviewed to assess prior cultures  -monitor temps  -f/u CBC  -supportive care  2. Other issues:   -care per medicine 88 y/o female with h/o dementia, chronic back pain, left foot drop, left hip surgery, laminectomy was admitted on 3/13 from [facility] for increased weakness, SOB, fever to 101F, decreased PO intake. The patient had a recent left hip replacement undergoing PT therapy. In ER, she received cefepime and vancomycin IV.    1. Low grade fever. Cough. B/l Lower lobes pneumonia. ?viral vs bacterial etiology. Pyuria. Probable UTI. Encephalopathy.  -mild leukocytosis  -obtain BC x 2, urine c/s  -start cefepime 1 gm IV q12h and azithromycin 500 mg PO qd  -reason for abx use and side effects reviewed with patient; monitor BMP   -respiratory carte  -old chart reviewed to assess prior cultures  -monitor temps  -f/u CBC  -supportive care  2. Other issues:   -care per medicine

## 2019-03-14 NOTE — PROVIDER CONTACT NOTE (OTHER) - SITUATION
Tele hospital  to put patient on list to see .
OFFICE IS AWARE OF CONSULT
Spoke with MD who advised he spoke with Dr Dutta and will not see patient at this time.  Dr Dutta will speak with daughter again and if consult needed will let ortho know.

## 2019-03-14 NOTE — CHART NOTE - NSCHARTNOTEFT_GEN_A_CORE
Upon Nutritional Assessment by the Registered Dietitian your patient was determined to meet criteria / has evidence of the following diagnosis/diagnoses:          [ ]  Mild Protein Calorie Malnutrition        [ ]  Moderate Protein Calorie Malnutrition        [ x] Severe Protein Calorie Malnutrition        [ ] Unspecified Protein Calorie Malnutrition        [x ] Underweight / BMI <19        [ ] Morbid Obesity / BMI > 40      Findings:    Pt is a 88 yo F w PMH dementia, chr back pain, left foot drop presenting from [facility] d/t weakness and SOB associated w fever and decreased PO intake. Pt is poor historian and most of information taken from medical chart, RN, nursing assistant. Unable to assess intake PTA. Pt was admitted x3 months ago and found to be severely malnourished. As per nursing assistant pt ate very well this morning consuming 50% of Camb[place]ian toast, 75% of scrambled eggs, a few spoons of oat meal, and orange juice. Pt was admitted w PNA which was likely impacting overall PO intake. Pt previously underwt and although not significant pt continues to lose wt unintentionally. Over the past 3 months pt has lost 6# (6% BW). No noted edema. Pt appears very thin and NFPE significant for muscle wasting: severe: temporal, clavicle, deltoid, interosseous, thigh, patellar, and calf regions; fat depletion: severe occipital and rib; moderate: triceps. Based on above pt meets criteria for severe malnutrition in the context of chronic illness.    Findings as based on:  •  Comprehensive nutrition assessment and consultation  •  Calorie counts (nutrient intake analysis)  •  Food acceptance and intake status from observations by staff  •  Follow up  •  Patient education  •  Intervention secondary to interdisciplinary rounds  •   concerns      Treatment:      1. add ensure enlive TID and encourage between meals   2. Provide maximum assistance w meals/supps PRN.   3. Encourage intake at each meal and order to pt preference.   4. Add MVI w minerals.   5. Monitor wt weekly.      The following diet has been recommended:      PROVIDER Section:     By signing this assessment you are acknowledging and agree with the diagnosis/diagnoses assigned by the Registered Dietitian    Comments:

## 2019-03-14 NOTE — CONSULT NOTE ADULT - SUBJECTIVE AND OBJECTIVE BOX
Patient is a 87y old  Female who presents with a chief complaint of cough, weakness.       HPI:  88 y/o female with h/o dementia, chronic back pain, left foot drop presents from [facility] due to weakness and sob.  Associated with fever, 101F, decreased po intake.  Much of history provided by daughter at presentation to the admitting physician and chart due to dementia.  States patient hasn't been progressing well with PT after hip replacement.    Pt seen and examined by me this am. She was unable to provide any answered to any questions. Appears to be comfortable and was mumbling.   No overnight events per staff.     In ED, noted to have b/l pna. s/p vanco/cefepime.      PAST MEDICAL HISTORY:  dementia, right foot drop  PAST SURGICAL HISTORY: laminectomyx2, left hip surgery  FAMILY HISTORY:   denies       PAST MEDICAL & SURGICAL HISTORY:  Dementia  History of lumbar laminectomy      MEDICATIONS  (STANDING):  ALBUTerol    90 MICROgram(s) HFA Inhaler 1 Puff(s) Inhalation every 4 hours  ALBUTerol/ipratropium for Nebulization 3 milliLiter(s) Nebulizer every 6 hours  azithromycin   Tablet 500 milliGRAM(s) Oral daily  cefepime  Injectable. 1000 milliGRAM(s) IV Push every 12 hours  mirtazapine 7.5 milliGRAM(s) Oral at bedtime  senna 2 Tablet(s) Oral at bedtime  tiotropium 18 MICROgram(s) Capsule 1 Capsule(s) Inhalation daily    MEDICATIONS  (PRN):  acetaminophen   Tablet .. 500 milliGRAM(s) Oral every 8 hours PRN Temp greater or equal to 38C (100.4F), Mild Pain (1 - 3)      FAMILY HISTORY:  Family history of cardiac disorder (Father)      SOCIAL HISTORY:  unable to obtain per pt.     REVIEW OF SYSTEMS:  per HPI.  Pt unable to provide any at this time        Vital Signs Last 24 Hrs  T(C): 37.2 (14 Mar 2019 11:45), Max: 37.9 (13 Mar 2019 21:41)  T(F): 98.9 (14 Mar 2019 11:45), Max: 100.2 (13 Mar 2019 21:41)  HR: 80 (14 Mar 2019 13:46) (80 - 95)  BP: 112/48 (14 Mar 2019 11:45) (112/48 - 153/58)  BP(mean): --  RR: 16 (14 Mar 2019 11:45) (16 - 23)  SpO2: 99% (14 Mar 2019 11:45) (96% - 99%)    PHYSICAL EXAM-    Constitutional: frail, very cachectic elderly female in no acute distress    Head: Head is normocephalic and atraumatic.      Neck: No jugular venous distention. No audible carotid bruits.    Cardiovascular: Regular rate and rhythm without S3, S4. No murmurs or rubs are appreciated.      Respiratory: Breath sounds are normal. No rales. No wheezing.    Abdomen: Soft, nontender, nondistended with positive bowel sounds.      Extremity: No tenderness. No  pitting edema     Neurologic: The patient is alert.    Skin: No rash, no obvious lesions noted.      Psychiatric: The patient appears to be emotionally stable.      INTERPRETATION OF TELEMETRY: not on     ECG: Sinus rythm , T wave inversion in V2-4, prolonged QT interval.  This was compared to her EKG from 18 which did not reveal any T wave inversions.     I&O's Detail      LABS:                        10.4   11.38 )-----------( 246      ( 14 Mar 2019 07:39 )             32.4     03-14    137  |  104  |  22  ----------------------------<  129<H>  3.9   |  23  |  0.57    Ca    8.3<L>      14 Mar 2019 07:39  Phos  3.0     03-14  Mg     1.8     03-14    TPro  6.4  /  Alb  2.1<L>  /  TBili  0.5  /  DBili  x   /  AST  20  /  ALT  57  /  AlkPhos  115  03-14    CARDIAC MARKERS ( 13 Mar 2019 22:46 )  <0.015 ng/mL / x     / x     / x     / x      CARDIAC MARKERS ( 13 Mar 2019 19:37 )  <0.015 ng/mL / x     / x     / x     / x      CARDIAC MARKERS ( 13 Mar 2019 13:22 )  <0.015 ng/mL / x     / x     / x     / x            Urinalysis Basic - ( 13 Mar 2019 23:09 )    Color: Yellow / Appearance: very cloudy / S.010 / pH: x  Gluc: x / Ketone: Negative  / Bili: Negative / Urobili: Negative mg/dL   Blood: x / Protein: 100 mg/dL / Nitrite: Positive   Leuk Esterase: Moderate / RBC: 0-2 /HPF / WBC >50   Sq Epi: x / Non Sq Epi: Negative / Bacteria: TNTC      I&O's Summary    BNP  RADIOLOGY & ADDITIONAL STUDIES:

## 2019-03-14 NOTE — DIETITIAN INITIAL EVALUATION ADULT. - FEEDING SKILL
Vital link informed   age appropriate assistance/pt would benefit from encouragement w meals and assistance PRN to optimize intake

## 2019-03-14 NOTE — CONSULT NOTE ADULT - ASSESSMENT
1. Abnormal EKG- there are deep t wave inversions noted in the precordial leads which many times could be related to CNS pathology.   No evidence of ischemia - so far cardiac enzymes negative.   No intracranial pathology per CT head.  Close monitoring of the electrolytes.   Close monitoring for now.     Pneumonia- Management per primary team.    Prolonged QT interval- avoid agents that can prolong the QT interval.  Mirtazepine can cause prolonged QT interval as <1% adverse effect.  Maintain K of 4 and mag of 2.    Other medical issues- Management per primary team.   Thank you for allowing me to participate in the care of this patient. Please feel free to contact me with any questions.

## 2019-03-14 NOTE — DIETITIAN INITIAL EVALUATION ADULT. - PHYSICAL APPEARANCE
BMI 15.5; NFPE significant for muscle wasting: severe: temporal, clavicle, deltoid, interosseous, thigh, patellar, and calf regions; fat depletion: severe occipital and rib; moderate: triceps./emaciated

## 2019-03-14 NOTE — DIETITIAN INITIAL EVALUATION ADULT. - PERTINENT MEDS FT
MEDICATIONS  (STANDING):  ALBUTerol    90 MICROgram(s) HFA Inhaler 1 Puff(s) Inhalation every 4 hours  ALBUTerol/ipratropium for Nebulization 3 milliLiter(s) Nebulizer every 6 hours  cefepime  Injectable. 1000 milliGRAM(s) IV Push every 8 hours  mirtazapine 7.5 milliGRAM(s) Oral at bedtime  senna 2 Tablet(s) Oral at bedtime  tiotropium 18 MICROgram(s) Capsule 1 Capsule(s) Inhalation daily  vancomycin  IVPB 1000 milliGRAM(s) IV Intermittent every 12 hours    MEDICATIONS  (PRN):  acetaminophen   Tablet .. 500 milliGRAM(s) Oral every 8 hours PRN Temp greater or equal to 38C (100.4F), Mild Pain (1 - 3)

## 2019-03-14 NOTE — CONSULT NOTE ADULT - SUBJECTIVE AND OBJECTIVE BOX
Patient is a 87y old  Female who presents with a chief complaint of cough, weakness (13 Mar 2019 19:12)    HPI:  88 y/o female with h/o dementia, chronic back pain, left foot drop, left hip surgery, laminectomy was admitted on 3/13 from [facility] for increased weakness, SOB, fever to 101F, decreased PO intake. The patient had a recent left hip replacement undergoing PT therapy. In ER, she received cefepime and vancomycin IV.      PMH: as above  PSH: as above  Meds: per reconciliation sheet, noted below  MEDICATIONS  (STANDING):  ALBUTerol    90 MICROgram(s) HFA Inhaler 1 Puff(s) Inhalation every 4 hours  ALBUTerol/ipratropium for Nebulization 3 milliLiter(s) Nebulizer every 6 hours  cefepime  Injectable. 1000 milliGRAM(s) IV Push every 8 hours  mirtazapine 7.5 milliGRAM(s) Oral at bedtime  senna 2 Tablet(s) Oral at bedtime  tiotropium 18 MICROgram(s) Capsule 1 Capsule(s) Inhalation daily  vancomycin  IVPB 1000 milliGRAM(s) IV Intermittent every 12 hours    MEDICATIONS  (PRN):  acetaminophen   Tablet .. 500 milliGRAM(s) Oral every 8 hours PRN Temp greater or equal to 38C (100.4F), Mild Pain (1 - 3)    Allergies    aspirin (Unknown)    Intolerances      Social: no smoking, no alcohol, no illegal drugs; no recent travel, no exposure to TB  To note that  was recently diagnosed with hMPv pulmonary infection  FAMILY HISTORY:  Family history of cardiac disorder (Father)    no history of premature cardiovascular disease in first degree relatives    ROS: the patient is confused; limited; denies HA, no dizziness, no sore throat, no CP, no palpitations, has SOB, has cough, no abdominal pain, no diarrhea, no N/V, no dysuria, no leg pain, no rash, no joint aches, no rectal pain or bleeding, no night sweats; has poor appetite; has increased weakenss  All other systems reviewed and are negative    Vital Signs Last 24 Hrs  T(C): 37.2 (14 Mar 2019 11:45), Max: 37.9 (13 Mar 2019 21:41)  T(F): 98.9 (14 Mar 2019 11:45), Max: 100.2 (13 Mar 2019 21:41)  HR: 92 (14 Mar 2019 11:45) (84 - 95)  BP: 112/48 (14 Mar 2019 11:45) (112/36 - 153/58)  BP(mean): --  RR: 16 (14 Mar 2019 11:45) (16 - 23)  SpO2: 99% (14 Mar 2019 11:45) (96% - 99%)  Daily     Daily Weight in k.4 (14 Mar 2019 10:48)    PE:    Constitutional: frail looking  HEENT: NC/AT, EOMI, PERRLA, conjunctivae clear; ears and nose atraumatic; pharynx benign  Neck: supple; thyroid not palpable  Back: no tenderness  Respiratory: respiratory effort normal; crackles at bases  Cardiovascular: S1S2 regular, no murmurs  Abdomen: soft, not tender, not distended, positive BS; no liver or spleen organomegaly  Genitourinary: no suprapubic tenderness  Lymphatic: no LN palpable  Musculoskeletal: no muscle tenderness, no joint swelling or tenderness  Extremities: no pedal edema  Neurological/ Psychiatric: confused, judgement and insight impaired; moving all extremities  Skin: no rashes; no palpable lesions    Labs: all available labs reviewed                        10.38 )-----------( 246      ( 14 Mar 2019 07:39 )             32.4     03-14    137  |  104  |  22  ----------------------------<  129<H>  3.9   |  23  |  0.57    Ca    8.3<L>      14 Mar 2019 07:39  Phos  3.0     -14  Mg     1.8     -14    TPro  6.4  /  Alb  2.1<L>  /  TBili  0.5  /  DBili  x   /  AST  20  /  ALT  57  /  AlkPhos  115  03-14     LIVER FUNCTIONS - ( 14 Mar 2019 07:39 )  Alb: 2.1 g/dL / Pro: 6.4 gm/dL / ALK PHOS: 115 U/L / ALT: 57 U/L / AST: 20 U/L / GGT: x           Urinalysis Basic - ( 13 Mar 2019 23:09 )    Color: Yellow / Appearance: very cloudy / S.010 / pH: x  Gluc: x / Ketone: Negative  / Bili: Negative / Urobili: Negative mg/dL   Blood: x / Protein: 100 mg/dL / Nitrite: Positive   Leuk Esterase: Moderate / RBC: 0-2 /HPF / WBC >50   Sq Epi: x / Non Sq Epi: Negative / Bacteria: TNTC    Rapid RVP Result: NotDetec: This Respiratory Panel uses polymerase chain reaction (PCR) to detect for  adenovirus; coronavirus (HKU1, NL63, 229E, OC43); human metapneumovirus  (hMPV); human enterovirus/rhinovirus (Entero/RV); influenza A; influenza  A/H1; influenza A/H3; influenza A/H1-2009; influenza B; parainfluenza  viruses 1, 2, 3, 4; respiratory syncytial virus; Mycoplasma pneumoniae;  and Chlamydophila pneumoniae. (19 @ 19:37)      Radiology: all available radiological tests reviewed    < from: CT Chest No Cont (19 @ 15:38) >  Bilateral lower lobe pneumonia and atelectasis left worse than right.   Small left pleural effusion.    < end of copied text >      Advanced directives addressed: full resuscitation Patient is a 87y old  Female who presents with a chief complaint of cough, weakness (13 Mar 2019 19:12)    HPI:  88 y/o female with h/o dementia, chronic back pain, left foot drop, left hip surgery, laminectomy was admitted on 3/13 from [facility] for increased weakness, SOB, fever to 101F, decreased PO intake. The patient had a recent left hip replacement undergoing PT therapy. In ER, she received cefepime and vancomycin IV.      PMH: as above  PSH: as above  Meds: per reconciliation sheet, noted below  MEDICATIONS  (STANDING):  ALBUTerol    90 MICROgram(s) HFA Inhaler 1 Puff(s) Inhalation every 4 hours  ALBUTerol/ipratropium for Nebulization 3 milliLiter(s) Nebulizer every 6 hours  cefepime  Injectable. 1000 milliGRAM(s) IV Push every 8 hours  mirtazapine 7.5 milliGRAM(s) Oral at bedtime  senna 2 Tablet(s) Oral at bedtime  tiotropium 18 MICROgram(s) Capsule 1 Capsule(s) Inhalation daily  vancomycin  IVPB 1000 milliGRAM(s) IV Intermittent every 12 hours    MEDICATIONS  (PRN):  acetaminophen   Tablet .. 500 milliGRAM(s) Oral every 8 hours PRN Temp greater or equal to 38C (100.4F), Mild Pain (1 - 3)    Allergies    aspirin (Unknown)    Intolerances      Social: no smoking, no alcohol, no illegal drugs; no recent travel, no exposure to TB  To note that  was recently diagnosed with hMPv pulmonary infection  FAMILY HISTORY:  Family history of cardiac disorder (Father)    no history of premature cardiovascular disease in first degree relatives    ROS: the patient is confused; limited; denies HA, no dizziness, no sore throat, no CP, has SOB, has cough, no abdominal pain, no diarrhea, no N/V, no leg pain, no rash, no joint aches, no night sweats; has poor appetite; has increased weakenss  All other systems reviewed and are negative    Vital Signs Last 24 Hrs  T(C): 37.2 (14 Mar 2019 11:45), Max: 37.9 (13 Mar 2019 21:41)  T(F): 98.9 (14 Mar 2019 11:45), Max: 100.2 (13 Mar 2019 21:41)  HR: 92 (14 Mar 2019 11:45) (84 - 95)  BP: 112/48 (14 Mar 2019 11:45) (112/36 - 153/58)  BP(mean): --  RR: 16 (14 Mar 2019 11:45) (16 - 23)  SpO2: 99% (14 Mar 2019 11:45) (96% - 99%)  Daily     Daily Weight in k.4 (14 Mar 2019 10:48)    PE:    Constitutional: frail looking  HEENT: NC/AT, EOMI, PERRLA, conjunctivae clear; ears and nose atraumatic; pharynx benign  Neck: supple; thyroid not palpable  Back: no tenderness  Respiratory: respiratory effort normal; crackles at bases  Cardiovascular: S1S2 regular, no murmurs  Abdomen: soft, not tender, not distended, positive BS; no liver or spleen organomegaly  Genitourinary: no suprapubic tenderness  Lymphatic: no LN palpable  Musculoskeletal: no muscle tenderness, no joint swelling or tenderness  Extremities: no pedal edema  Neurological/ Psychiatric: confused, judgement and insight impaired; moving all extremities  Skin: no rashes; no palpable lesions    Labs: all available labs reviewed                        10.38 )-----------( 246      ( 14 Mar 2019 07:39 )             32.4     03-14    137  |  104  |  22  ----------------------------<  129<H>  3.9   |  23  |  0.57    Ca    8.3<L>      14 Mar 2019 07:39  Phos  3.0     -14  Mg     1.8     -    TPro  6.4  /  Alb  2.1<L>  /  TBili  0.5  /  DBili  x   /  AST  20  /  ALT  57  /  AlkPhos  115  03-14     LIVER FUNCTIONS - ( 14 Mar 2019 07:39 )  Alb: 2.1 g/dL / Pro: 6.4 gm/dL / ALK PHOS: 115 U/L / ALT: 57 U/L / AST: 20 U/L / GGT: x           Urinalysis Basic - ( 13 Mar 2019 23:09 )    Color: Yellow / Appearance: very cloudy / S.010 / pH: x  Gluc: x / Ketone: Negative  / Bili: Negative / Urobili: Negative mg/dL   Blood: x / Protein: 100 mg/dL / Nitrite: Positive   Leuk Esterase: Moderate / RBC: 0-2 /HPF / WBC >50   Sq Epi: x / Non Sq Epi: Negative / Bacteria: TNTC    Rapid RVP Result: NotDetec: This Respiratory Panel uses polymerase chain reaction (PCR) to detect for  adenovirus; coronavirus (HKU1, NL63, 229E, OC43); human metapneumovirus  (hMPV); human enterovirus/rhinovirus (Entero/RV); influenza A; influenza  A/H1; influenza A/H3; influenza A/H1-2009; influenza B; parainfluenza  viruses 1, 2, 3, 4; respiratory syncytial virus; Mycoplasma pneumoniae;  and Chlamydophila pneumoniae. (19 @ 19:37)      Radiology: all available radiological tests reviewed    < from: CT Chest No Cont (19 @ 15:38) >  Bilateral lower lobe pneumonia and atelectasis left worse than right.   Small left pleural effusion.    < end of copied text >      Advanced directives addressed: full resuscitation

## 2019-03-14 NOTE — PROGRESS NOTE ADULT - SUBJECTIVE AND OBJECTIVE BOX
cc: cough, weakness  88 y/o female with h/o dementia, chronic back pain, left foot drop presents from [facility] due to weakness and sob.  associated with fever, 101F, decreased po intake.  much of history provided by daughter and chart due to dementia.  states patient hasnt been progressing well with PT after hip replacement.   is also admitted [place] [facility] for similar symptoms. In ED, noted to have b/l pna. s/p vanco/cefepime.    3/14: Pt lying in bed, pleasantly confused and offers no complaints. Spoke to daughter at bedside and discussed diagnosis and plan with her.    Vital Signs Last 24 Hrs  T(C): 37.2 (14 Mar 2019 11:45), Max: 37.9 (13 Mar 2019 21:41)  T(F): 98.9 (14 Mar 2019 11:45), Max: 100.2 (13 Mar 2019 21:41)  HR: 80 (14 Mar 2019 13:46) (80 - 95)  BP: 112/48 (14 Mar 2019 11:45) (112/48 - 153/58)  BP(mean): --  RR: 16 (14 Mar 2019 11:45) (16 - 23)  SpO2: 99% (14 Mar 2019 11:45) (96% - 99%)    PHYSICAL EXAM:    Constitutional: NAD, awake and alert, frail, cachectic,   HEENT: PERR, EOMI, Normal Hearing, MMM, temporal wasting   Neck: Soft and supple  Respiratory: Breath sounds are clear bilaterally, No wheezing, rales or rhonchi  Cardiovascular: S1 and S2, regular rate and rhythm, no Murmurs, gallops or rubs  Gastrointestinal: Bowel Sounds present, soft, nontender, nondistended, no guarding, no rebound  Extremities: No peripheral edema  Neurological: A/O x 3, no focal deficits in my limited exam  Skin: No rashes        MEDICATIONS  (STANDING):  ALBUTerol    90 MICROgram(s) HFA Inhaler 1 Puff(s) Inhalation every 4 hours  ALBUTerol/ipratropium for Nebulization 3 milliLiter(s) Nebulizer every 6 hours  azithromycin   Tablet 500 milliGRAM(s) Oral daily  cefepime  Injectable. 1000 milliGRAM(s) IV Push every 12 hours  mirtazapine 7.5 milliGRAM(s) Oral at bedtime  senna 2 Tablet(s) Oral at bedtime  tiotropium 18 MICROgram(s) Capsule 1 Capsule(s) Inhalation daily    MEDICATIONS  (PRN):  acetaminophen   Tablet .. 500 milliGRAM(s) Oral every 8 hours PRN Temp greater or equal to 38C (100.4F), Mild Pain (1 - 3)    CARDIAC MARKERS ( 13 Mar 2019 22:46 )  <0.015 ng/mL / x     / x     / x     / x      CARDIAC MARKERS ( 13 Mar 2019 19:37 )  <0.015 ng/mL / x     / x     / x     / x      CARDIAC MARKERS ( 13 Mar 2019 13:22 )  <0.015 ng/mL / x     / x     / x     / x                                10.4   11.38 )-----------( 246      ( 14 Mar 2019 07:39 )             32.4     -    137  |  104  |  22  ----------------------------<  129<H>  3.9   |  23  |  0.57    Ca    8.3<L>      14 Mar 2019 07:39  Phos  3.0     -  Mg     1.8     -    TPro  6.4  /  Alb  2.1<L>  /  TBili  0.5  /  DBili  x   /  AST  20  /  ALT  57  /  AlkPhos  115  03-14    CAPILLARY BLOOD GLUCOSE        LIVER FUNCTIONS - ( 14 Mar 2019 07:39 )  Alb: 2.1 g/dL / Pro: 6.4 gm/dL / ALK PHOS: 115 U/L / ALT: 57 U/L / AST: 20 U/L / GGT: x             Urinalysis Basic - ( 13 Mar 2019 23:09 )    Color: Yellow / Appearance: very cloudy / S.010 / pH: x  Gluc: x / Ketone: Negative  / Bili: Negative / Urobili: Negative mg/dL   Blood: x / Protein: 100 mg/dL / Nitrite: Positive   Leuk Esterase: Moderate / RBC: 0-2 /HPF / WBC >50   Sq Epi: x / Non Sq Epi: Negative / Bacteria: TNTC      Assessment and Plan:  88 y/o female    sob/cough: sepsis secondary to acute bacterial pnuemonia and UTI  -cefepime and azithro per ID for probable gram neg bacteria and atypicals   -duoneb q6h and albuterol prn   -RVP neg  -supportive care  -f/u cultures    t wave inversions on ecg, d/w cardiology-no plan for cath at this time,   -case d/w cardiology, recommend holding asa/plavix, statin, bb  -trend troponin   -monitor    Left Hip Hemiarthroplasty: stable  -per daughter requesting consult w/ Dr. Fishman for evaluation     Dementia, malnutrution, hypoalbuminemia:  -supportive care  -remeron   -nutrition consult    dvt prophylaxis   -lovenox sc

## 2019-03-14 NOTE — DIETITIAN INITIAL EVALUATION ADULT. - OTHER INFO
Pt seen as consult for unspecified assessment. Pt is a 88 yo F w PMH dementia, chr back pain, left foot drop presenting from [facility] d/t weakness and SOB associated w fever and decreased PO intake. Pt is poor historian and most of information taken from medical chart, RN, nursing assistant. Unable to assess intake PTA. Pt was admitted x3 months ago and found to be severly malnourished. As per nursing assistant pt ate very well this morning consuming 50% of Swedish toast, 75% of scrambled eggs, a few spoons of oat meal, and orange juice. Pt was admitted w PNA which waslikely impacting overall PO intake. Pt previously underwt and although not significant pt continues to lose wt unintentionally. Over the past 3 months pt has lost 6# (6% BW). No noted edema. Pt appears very thin and NFPE significant for muscle wasting: severe: temporal, clavicle, deltoid, interosseous, thigh, patellar, and calf regions; fat depletion: severe occipital and rib; moderate: triceps. Based on above pt meets criteria for severe malnutrition in the context of chronic illness. Recommend providing maximum assistance and encourgment w meals/supp to optimize intkae. Will add oral supplement to add additional kcal/protein to intake. No noted c/o n/v/c/d; last BM noted 3/14. Skin intact; jean paul 13=high risk for skin breakdown. Pt currently on [facility] soft diet. Recommendations: 1. add ensure enlive TID and encourage between meals 2. Provide maximum assistance w meals/supps PRN. 3. Encourage intake at each meal and order to pt preference. 4. Add MVI w minerals. 5. Monitor wt weekly.

## 2019-03-15 LAB
ANION GAP SERPL CALC-SCNC: 10 MMOL/L — SIGNIFICANT CHANGE UP (ref 5–17)
BUN SERPL-MCNC: 18 MG/DL — SIGNIFICANT CHANGE UP (ref 7–23)
CALCIUM SERPL-MCNC: 8.7 MG/DL — SIGNIFICANT CHANGE UP (ref 8.5–10.1)
CHLORIDE SERPL-SCNC: 105 MMOL/L — SIGNIFICANT CHANGE UP (ref 96–108)
CO2 SERPL-SCNC: 24 MMOL/L — SIGNIFICANT CHANGE UP (ref 22–31)
CREAT SERPL-MCNC: 0.66 MG/DL — SIGNIFICANT CHANGE UP (ref 0.5–1.3)
GLUCOSE SERPL-MCNC: 108 MG/DL — HIGH (ref 70–99)
HCT VFR BLD CALC: 35.7 % — SIGNIFICANT CHANGE UP (ref 34.5–45)
HGB BLD-MCNC: 11.3 G/DL — LOW (ref 11.5–15.5)
MCHC RBC-ENTMCNC: 27.8 PG — SIGNIFICANT CHANGE UP (ref 27–34)
MCHC RBC-ENTMCNC: 31.7 GM/DL — LOW (ref 32–36)
MCV RBC AUTO: 87.9 FL — SIGNIFICANT CHANGE UP (ref 80–100)
NRBC # BLD: 0 /100 WBCS — SIGNIFICANT CHANGE UP (ref 0–0)
PLATELET # BLD AUTO: 295 K/UL — SIGNIFICANT CHANGE UP (ref 150–400)
POTASSIUM SERPL-MCNC: 4.1 MMOL/L — SIGNIFICANT CHANGE UP (ref 3.5–5.3)
POTASSIUM SERPL-SCNC: 4.1 MMOL/L — SIGNIFICANT CHANGE UP (ref 3.5–5.3)
RBC # BLD: 4.06 M/UL — SIGNIFICANT CHANGE UP (ref 3.8–5.2)
RBC # FLD: 13.4 % — SIGNIFICANT CHANGE UP (ref 10.3–14.5)
SODIUM SERPL-SCNC: 139 MMOL/L — SIGNIFICANT CHANGE UP (ref 135–145)
WBC # BLD: 13.28 K/UL — HIGH (ref 3.8–10.5)
WBC # FLD AUTO: 13.28 K/UL — HIGH (ref 3.8–10.5)

## 2019-03-15 PROCEDURE — 93010 ELECTROCARDIOGRAM REPORT: CPT

## 2019-03-15 RX ADMIN — Medication 3 MILLILITER(S): at 19:58

## 2019-03-15 RX ADMIN — Medication 3 MILLILITER(S): at 00:58

## 2019-03-15 RX ADMIN — MIRTAZAPINE 7.5 MILLIGRAM(S): 45 TABLET, ORALLY DISINTEGRATING ORAL at 22:16

## 2019-03-15 RX ADMIN — AZITHROMYCIN 500 MILLIGRAM(S): 500 TABLET, FILM COATED ORAL at 12:13

## 2019-03-15 RX ADMIN — Medication 3 MILLILITER(S): at 08:08

## 2019-03-15 RX ADMIN — SENNA PLUS 2 TABLET(S): 8.6 TABLET ORAL at 22:16

## 2019-03-15 RX ADMIN — Medication 500 MILLIGRAM(S): at 12:18

## 2019-03-15 RX ADMIN — CEFEPIME 1000 MILLIGRAM(S): 1 INJECTION, POWDER, FOR SOLUTION INTRAMUSCULAR; INTRAVENOUS at 04:53

## 2019-03-15 RX ADMIN — CEFEPIME 1000 MILLIGRAM(S): 1 INJECTION, POWDER, FOR SOLUTION INTRAMUSCULAR; INTRAVENOUS at 17:55

## 2019-03-15 NOTE — PROGRESS NOTE ADULT - ASSESSMENT
88 y/o female with h/o dementia, chronic back pain, left foot drop, left hip surgery, laminectomy was admitted on 3/13 from [facility] for increased weakness, SOB, fever to 101F, decreased PO intake. The patient had a recent left hip replacement undergoing PT therapy. In ER, she received cefepime and vancomycin IV.    1. Low grade fever improving. Cough. B/l Lower lobes pneumonia. ?viral vs bacterial etiology. Pyuria. Probable UTI. Encephalopathy.  -mild leukocytosis  -f/u BC x 2, urine c/s  -on cefepime 1 gm IV q12h and azithromycin 500 mg PO qd # 2  -tolerating abx well so far; no side effects noted  -respiratory care  -continue IV abx coverage  -monitor temps  -f/u CBC  -supportive care  2. Other issues:   -care per medicine

## 2019-03-15 NOTE — PROGRESS NOTE ADULT - ASSESSMENT
1. Abnormal EKG- there are deep t wave inversions noted in the precordial leads which many times could be related to CNS pathology.   No evidence of ischemia - so far cardiac enzymes negative.   No intracranial pathology per CT head.  Close monitoring of the electrolytes.   Close monitoring for now.   Repeat EKG today.     Pneumonia- Management per primary team.    Prolonged QT interval- avoid agents that can prolong the QT interval.  Mirtazepine can cause prolonged QT interval as <1% adverse effect.  Maintain K of 4 and mag of 2.    Other medical issues- Management per primary team.   Thank you for allowing me to participate in the care of this patient. Please feel free to contact me with any questions.

## 2019-03-15 NOTE — PROGRESS NOTE ADULT - SUBJECTIVE AND OBJECTIVE BOX
cc: cough, weakness  86 y/o female with h/o dementia, chronic back pain, left foot drop presents from [facility] due to weakness and sob.  associated with fever, 101F, decreased po intake.  much of history provided by daughter and chart due to dementia.  states patient hasnt been progressing well with PT after hip replacement.   is also admitted in [facility] for similar symptoms. In ED, noted to have b/l pna. s/p vanco/cefepime.    3/14: Pt lying in bed, pleasantly confused and offers no complaints. Spoke to daughter at bedside and discussed diagnosis and plan with her.  3/15: Lying in bed, comfortable.  No fever, chills, n, v.    REVIEW OF SYSTEMS: All other review of systems is negative unless indicated above.    Vital Signs Last 24 Hrs  T(C): 38.6 (15 Mar 2019 13:15), Max: 38.9 (15 Mar 2019 12:18)  T(F): 101.4 (15 Mar 2019 13:15), Max: 102.1 (15 Mar 2019 12:18)  HR: 114 (15 Mar 2019 12:18) (80 - 114)  BP: 112/53 (15 Mar 2019 12:18) (112/53 - 143/-)  BP(mean): 62 (14 Mar 2019 20:37) (62 - 62)  RR: 18 (15 Mar 2019 12:18) (18 - 18)  SpO2: 98% (15 Mar 2019 12:18) (97% - 98%)    PHYSICAL EXAM:    Constitutional: NAD, awake and alert, frail, cachectic,   HEENT: PERR, EOMI, Normal Hearing, MMM, temporal wasting   Neck: Soft and supple  Respiratory: Breath sounds are clear bilaterally, No wheezing, rales or rhonchi  Cardiovascular: S1 and S2, regular rate and rhythm, no Murmurs, gallops or rubs  Gastrointestinal: Bowel Sounds present, soft, nontender, nondistended, no guarding, no rebound  Extremities: No peripheral edema  Neurological: A/O x 3, no focal deficits in my limited exam  Skin: No rashes    MEDICATIONS  (STANDING):  ALBUTerol    90 MICROgram(s) HFA Inhaler 1 Puff(s) Inhalation every 4 hours  ALBUTerol/ipratropium for Nebulization 3 milliLiter(s) Nebulizer every 6 hours  azithromycin   Tablet 500 milliGRAM(s) Oral daily  cefepime  Injectable. 1000 milliGRAM(s) IV Push every 12 hours  mirtazapine 7.5 milliGRAM(s) Oral at bedtime  senna 2 Tablet(s) Oral at bedtime  tiotropium 18 MICROgram(s) Capsule 1 Capsule(s) Inhalation daily    MEDICATIONS  (PRN):  acetaminophen   Tablet .. 500 milliGRAM(s) Oral every 8 hours PRN Temp greater or equal to 38C (100.4F), Mild Pain (1 - 3)    CARDIAC MARKERS ( 13 Mar 2019 22:46 )  <0.015 ng/mL / x     / x     / x     / x      CARDIAC MARKERS ( 13 Mar 2019 19:37 )  <0.015 ng/mL / x     / x     / x     / x                                11.3   13.28 )-----------( 295      ( 15 Mar 2019 06:58 )             35.7     03-15    139  |  105  |  18  ----------------------------<  108<H>  4.1   |  24  |  0.66    Ca    8.7      15 Mar 2019 06:58  Phos  3.0     03-14  Mg     1.8     03-14    TPro  6.4  /  Alb  2.1<L>  /  TBili  0.5  /  DBili  x   /  AST  20  /  ALT  57  /  AlkPhos  115  03-14    CAPILLARY BLOOD GLUCOSE        LIVER FUNCTIONS - ( 14 Mar 2019 07:39 )  Alb: 2.1 g/dL / Pro: 6.4 gm/dL / ALK PHOS: 115 U/L / ALT: 57 U/L / AST: 20 U/L / GGT: x             Urinalysis Basic - ( 13 Mar 2019 23:09 )    Color: Yellow / Appearance: very cloudy / S.010 / pH: x  Gluc: x / Ketone: Negative  / Bili: Negative / Urobili: Negative mg/dL   Blood: x / Protein: 100 mg/dL / Nitrite: Positive   Leuk Esterase: Moderate / RBC: 0-2 /HPF / WBC >50   Sq Epi: x / Non Sq Epi: Negative / Bacteria: TNTC      Assessment and Plan:  86 y/o female    sob/cough: sepsis secondary to acute bacterial pneumonia and UTI  -cefepime and azithro per ID for probable gram neg bacteria and atypical   -duoneb q6h and albuterol prn   -RVP neg  -supportive care  -f/u cultures    t wave inversions on ecg, d/w cardiology-no plan for cath at this time,   -case d/w cardiology, recommend holding asa/plavix, statin, bb  -trend troponin   -monitor    Left Hip Hemiarthroplasty: stable  -per daughter requesting consult w/ Dr. Fishman for evaluation     advanced Dementia, malnutrition hypoalbuminemia:  -mentation at baseline   -supportive care  Remeron   -nutrition consult    dvt prophylaxis   -lovenox sc     Dispo:  -Anticipate discharge Monday if remains medically stable. cc: cough, weakness  86 y/o female with h/o dementia, chronic back pain, left foot drop presents from [facility] due to weakness and sob.  associated with fever, 101F, decreased po intake.  much of history provided by daughter and chart due to dementia.  states patient hasnt been progressing well with PT after hip replacement.   is also admitted in [facility] for similar symptoms. In ED, noted to have b/l pna. s/p vanco/cefepime.    3/14: Pt lying in bed, pleasantly confused and offers no complaints. Spoke to daughter at bedside and discussed diagnosis and plan with her.  3/15: Lying in bed, comfortable.  No fever, chills, n, v.    REVIEW OF SYSTEMS: All other review of systems is negative unless indicated above.    Vital Signs Last 24 Hrs  T(C): 38.6 (15 Mar 2019 13:15), Max: 38.9 (15 Mar 2019 12:18)  T(F): 101.4 (15 Mar 2019 13:15), Max: 102.1 (15 Mar 2019 12:18)  HR: 114 (15 Mar 2019 12:18) (80 - 114)  BP: 112/53 (15 Mar 2019 12:18) (112/53 - 143/-)  BP(mean): 62 (14 Mar 2019 20:37) (62 - 62)  RR: 18 (15 Mar 2019 12:18) (18 - 18)  SpO2: 98% (15 Mar 2019 12:18) (97% - 98%)    PHYSICAL EXAM:    Constitutional: NAD, awake and alert, frail, cachectic,   HEENT: PERR, EOMI, Normal Hearing, MMM, temporal wasting   Neck: Soft and supple  Respiratory: Breath sounds are clear bilaterally, No wheezing, rales or rhonchi  Cardiovascular: S1 and S2, regular rate and rhythm, no Murmurs, gallops or rubs  Gastrointestinal: Bowel Sounds present, soft, nontender, nondistended, no guarding, no rebound  Extremities: No peripheral edema  Neurological: A/O x 3, no focal deficits in my limited exam  Skin: No rashes    MEDICATIONS  (STANDING):  ALBUTerol    90 MICROgram(s) HFA Inhaler 1 Puff(s) Inhalation every 4 hours  ALBUTerol/ipratropium for Nebulization 3 milliLiter(s) Nebulizer every 6 hours  azithromycin   Tablet 500 milliGRAM(s) Oral daily  cefepime  Injectable. 1000 milliGRAM(s) IV Push every 12 hours  mirtazapine 7.5 milliGRAM(s) Oral at bedtime  senna 2 Tablet(s) Oral at bedtime  tiotropium 18 MICROgram(s) Capsule 1 Capsule(s) Inhalation daily    MEDICATIONS  (PRN):  acetaminophen   Tablet .. 500 milliGRAM(s) Oral every 8 hours PRN Temp greater or equal to 38C (100.4F), Mild Pain (1 - 3)    CARDIAC MARKERS ( 13 Mar 2019 22:46 )  <0.015 ng/mL / x     / x     / x     / x      CARDIAC MARKERS ( 13 Mar 2019 19:37 )  <0.015 ng/mL / x     / x     / x     / x                                11.3   13.28 )-----------( 295      ( 15 Mar 2019 06:58 )             35.7     03-15    139  |  105  |  18  ----------------------------<  108<H>  4.1   |  24  |  0.66    Ca    8.7      15 Mar 2019 06:58  Phos  3.0     03-14  Mg     1.8     03-14    TPro  6.4  /  Alb  2.1<L>  /  TBili  0.5  /  DBili  x   /  AST  20  /  ALT  57  /  AlkPhos  115  03-14    CAPILLARY BLOOD GLUCOSE        LIVER FUNCTIONS - ( 14 Mar 2019 07:39 )  Alb: 2.1 g/dL / Pro: 6.4 gm/dL / ALK PHOS: 115 U/L / ALT: 57 U/L / AST: 20 U/L / GGT: x             Urinalysis Basic - ( 13 Mar 2019 23:09 )    Color: Yellow / Appearance: very cloudy / S.010 / pH: x  Gluc: x / Ketone: Negative  / Bili: Negative / Urobili: Negative mg/dL   Blood: x / Protein: 100 mg/dL / Nitrite: Positive   Leuk Esterase: Moderate / RBC: 0-2 /HPF / WBC >50   Sq Epi: x / Non Sq Epi: Negative / Bacteria: TNTC      Assessment and Plan:  86 y/o female    sob/cough: sepsis secondary to acute bacterial pneumonia and UTI  -cefepime and azithro per ID for probable gram neg bacteria and atypical   -duoneb q6h and albuterol prn   -RVP neg  -supportive care  -f/u cultures    t wave inversions on ecg, d/w cardiology-no plan for cath at this time,   -case d/w cardiology, recommend holding asa/plavix, statin, bb  -trend troponin   -monitor    Left Hip Hemiarthroplasty: stable  -per daughter requesting consult w/ Dr. Fishman for evaluation though does not appear to have active issue - follow up outpatient unless acute issue arises.     advanced Dementia, malnutrition hypoalbuminemia:  -mentation at baseline   -supportive care  Remeron   -nutrition consult    dvt prophylaxis   -lovenox sc     Dispo:  -Anticipate discharge Monday if remains medically stable.

## 2019-03-15 NOTE — CDI QUERY NOTE - [id number]_GEN_ALL_CORE_HH
Documentation on chart pt. admitted for Sepsis, UTI, and Pneumonia.    WBC 12 - HR 95 - RR 23    Pt. has dementia and is pleasantly confused. Daughter in ED states patient is at baseline.      Alert & Oriented to person and place,    ID documented Encephalopathy.    Please clarify a diagnosis based on the above clinical indicators.    A) Metabolic Encephalopathy  B) Advancing dementia  C) No indications of Encephalopathy  D) Other ( Please specify condition)

## 2019-03-15 NOTE — PROGRESS NOTE ADULT - SUBJECTIVE AND OBJECTIVE BOX
Patient is a 87y old  Female who presents with a chief complaint of cough, weakness.       HPI:  88 y/o female with h/o dementia, chronic back pain, left foot drop presents from [facility] due to weakness and sob.  Associated with fever, 101F, decreased po intake.  Much of history provided by daughter at presentation to the admitting physician and chart due to dementia.  States patient hasn't been progressing well with PT after hip replacement.    Pt seen and examined by me this am. She was unable to provide any answered to any questions. Appears to be comfortable and was mumbling.   No overnight events per staff.     In ED, noted to have b/l pna. s/p vanco/cefepime.    3/15-p t seen and exmained this am. No overnight issues per staff.  No distress.  pt not verbal and she is able to mumble. Confused.       PAST MEDICAL HISTORY:  dementia, right foot drop  PAST SURGICAL HISTORY: laminectomyx2, left hip surgery  FAMILY HISTORY:   denies       PAST MEDICAL & SURGICAL HISTORY:  Dementia  History of lumbar laminectomy      MEDICATIONS  (STANDING):  ALBUTerol    90 MICROgram(s) HFA Inhaler 1 Puff(s) Inhalation every 4 hours  ALBUTerol/ipratropium for Nebulization 3 milliLiter(s) Nebulizer every 6 hours  azithromycin   Tablet 500 milliGRAM(s) Oral daily  cefepime  Injectable. 1000 milliGRAM(s) IV Push every 12 hours  mirtazapine 7.5 milliGRAM(s) Oral at bedtime  senna 2 Tablet(s) Oral at bedtime  tiotropium 18 MICROgram(s) Capsule 1 Capsule(s) Inhalation daily    MEDICATIONS  (PRN):  acetaminophen   Tablet .. 500 milliGRAM(s) Oral every 8 hours PRN Temp greater or equal to 38C (100.4F), Mild Pain (1 - 3)      FAMILY HISTORY:  Family history of cardiac disorder (Father)      SOCIAL HISTORY:  unable to obtain per pt.     REVIEW OF SYSTEMS:  per HPI.  Pt unable to provide any at this time        Vital Signs Last 24 Hrs  T(C): 37.2 (14 Mar 2019 11:45), Max: 37.9 (13 Mar 2019 21:41)  T(F): 98.9 (14 Mar 2019 11:45), Max: 100.2 (13 Mar 2019 21:41)  HR: 80 (14 Mar 2019 13:46) (80 - 95)  BP: 112/48 (14 Mar 2019 11:45) (112/48 - 153/58)  BP(mean): --  RR: 16 (14 Mar 2019 11:45) (16 - 23)  SpO2: 99% (14 Mar 2019 11:45) (96% - 99%)    PHYSICAL EXAM-    Constitutional: frail, very cachectic elderly female in no acute distress    Head: Head is normocephalic and atraumatic.      Neck: No jugular venous distention. No audible carotid bruits.    Cardiovascular: Regular rate and rhythm without S3, S4. No murmurs or rubs are appreciated.      Respiratory: Breath sounds are normal. No rales. No wheezing.    Abdomen: Soft, nontender, nondistended with positive bowel sounds.      Extremity: No tenderness. No  pitting edema     Neurologic: The patient is alert.    Skin: No rash, no obvious lesions noted.      Psychiatric: The patient appears to be emotionally stable.      INTERPRETATION OF TELEMETRY: not on     ECG: Sinus rythm , T wave inversion in V2-4, prolonged QT interval.  This was compared to her EKG from 18 which did not reveal any T wave inversions.     I&O's Detail      LABS:                        10.4   11.38 )-----------( 246      ( 14 Mar 2019 07:39 )             32.4     03-14    137  |  104  |  22  ----------------------------<  129<H>  3.9   |  23  |  0.57    Ca    8.3<L>      14 Mar 2019 07:39  Phos  3.0     03-14  Mg     1.8     03-14    TPro  6.4  /  Alb  2.1<L>  /  TBili  0.5  /  DBili  x   /  AST  20  /  ALT  57  /  AlkPhos  115  03-14    CARDIAC MARKERS ( 13 Mar 2019 22:46 )  <0.015 ng/mL / x     / x     / x     / x      CARDIAC MARKERS ( 13 Mar 2019 19:37 )  <0.015 ng/mL / x     / x     / x     / x      CARDIAC MARKERS ( 13 Mar 2019 13:22 )  <0.015 ng/mL / x     / x     / x     / x            Urinalysis Basic - ( 13 Mar 2019 23:09 )    Color: Yellow / Appearance: very cloudy / S.010 / pH: x  Gluc: x / Ketone: Negative  / Bili: Negative / Urobili: Negative mg/dL   Blood: x / Protein: 100 mg/dL / Nitrite: Positive   Leuk Esterase: Moderate / RBC: 0-2 /HPF / WBC >50   Sq Epi: x / Non Sq Epi: Negative / Bacteria: TNTC      I&O's Summary    BNP  RADIOLOGY & ADDITIONAL STUDIES:

## 2019-03-15 NOTE — PROGRESS NOTE ADULT - SUBJECTIVE AND OBJECTIVE BOX
Date of service: 03-15-19 @ 11:46    Lying in bed in NAD  Weak looking  Has dry cough    ROS: no fever or chills; poorly verbal    MEDICATIONS  (STANDING):  ALBUTerol    90 MICROgram(s) HFA Inhaler 1 Puff(s) Inhalation every 4 hours  ALBUTerol/ipratropium for Nebulization 3 milliLiter(s) Nebulizer every 6 hours  azithromycin   Tablet 500 milliGRAM(s) Oral daily  cefepime  Injectable. 1000 milliGRAM(s) IV Push every 12 hours  mirtazapine 7.5 milliGRAM(s) Oral at bedtime  senna 2 Tablet(s) Oral at bedtime  tiotropium 18 MICROgram(s) Capsule 1 Capsule(s) Inhalation daily      Vital Signs Last 24 Hrs  T(C): 36.9 (15 Mar 2019 04:58), Max: 37.1 (14 Mar 2019 16:50)  T(F): 98.4 (15 Mar 2019 04:58), Max: 98.8 (14 Mar 2019 16:50)  HR: 97 (15 Mar 2019 04:58) (80 - 98)  BP: 117/75 (15 Mar 2019 04:58) (117/75 - 143/-)  BP(mean): 62 (14 Mar 2019 20:37) (62 - 62)  RR: 18 (15 Mar 2019 04:58) (18 - 18)  SpO2: 97% (15 Mar 2019 04:58) (97% - 97%)    Physical Exam:      Constitutional: frail looking  HEENT: NC/AT, EOMI, PERRLA, conjunctivae clear  Neck: supple; thyroid not palpable  Back: no tenderness  Respiratory: respiratory effort normal; crackles at bases  Cardiovascular: S1S2 regular, no murmurs  Abdomen: soft, not tender, not distended, positive BS  Genitourinary: no suprapubic tenderness  Lymphatic: no LN palpable  Musculoskeletal: no muscle tenderness, no joint swelling or tenderness  Extremities: no pedal edema  Neurological/ Psychiatric: confused, moving all extremities  Skin: no rashes; no palpable lesions    Labs: reviewed                        11.3   13.28 )-----------( 295      ( 15 Mar 2019 06:58 )             35.7     03-15    139  |  105  |  18  ----------------------------<  108<H>  4.1   |  24  |  0.66    Ca    8.7      15 Mar 2019 06:58  Phos  3.0     03-14  Mg     1.8     -14    TPro  6.4  /  Alb  2.1<L>  /  TBili  0.5  /  DBili  x   /  AST  20  /  ALT  57  /  AlkPhos  115  14                        10.4   11.38 )-----------( 246      ( 14 Mar 2019 07:39 )             32.4     -    137  |  104  |  22  ----------------------------<  129<H>  3.9   |  23  |  0.57    Ca    8.3<L>      14 Mar 2019 07:39  Phos  3.0     03-14  Mg     1.8     -14    TPro  6.4  /  Alb  2.1<L>  /  TBili  0.5  /  DBili  x   /  AST  20  /  ALT  57  /  AlkPhos  115  14     LIVER FUNCTIONS - ( 14 Mar 2019 07:39 )  Alb: 2.1 g/dL / Pro: 6.4 gm/dL / ALK PHOS: 115 U/L / ALT: 57 U/L / AST: 20 U/L / GGT: x           Urinalysis Basic - ( 13 Mar 2019 23:09 )    Color: Yellow / Appearance: very cloudy / S.010 / pH: x  Gluc: x / Ketone: Negative  / Bili: Negative / Urobili: Negative mg/dL   Blood: x / Protein: 100 mg/dL / Nitrite: Positive   Leuk Esterase: Moderate / RBC: 0-2 /HPF / WBC >50   Sq Epi: x / Non Sq Epi: Negative / Bacteria: TNTC    Rapid RVP Result: NotDetec: This Respiratory Panel uses polymerase chain reaction (PCR) to detect for  adenovirus; coronavirus (HKU1, NL63, 229E, OC43); human metapneumovirus  (hMPV); human enterovirus/rhinovirus (Entero/RV); influenza A; influenza  A/H1; influenza A/H3; influenza A/H1-2009; influenza B; parainfluenza  viruses 1, 2, 3, 4; respiratory syncytial virus; Mycoplasma pneumoniae;  and Chlamydophila pneumoniae. (19 @ 19:37)      Culture - Blood (collected 13 Mar 2019 17:54)  Source: .Blood None  Preliminary Report (15 Mar 2019 02:02):    No growth to date.    Culture - Blood (collected 13 Mar 2019 17:54)  Source: .Blood None  Preliminary Report (15 Mar 2019 02:02):    No growth to date.    Radiology: all available radiological tests reviewed    < from: CT Chest No Cont (19 @ 15:38) >  Bilateral lower lobe pneumonia and atelectasis left worse than right.   Small left pleural effusion.    < end of copied text >      Advanced directives addressed: full resuscitation

## 2019-03-16 LAB
-  AMIKACIN: SIGNIFICANT CHANGE UP
-  AMPICILLIN/SULBACTAM: SIGNIFICANT CHANGE UP
-  AMPICILLIN: SIGNIFICANT CHANGE UP
-  AZTREONAM: SIGNIFICANT CHANGE UP
-  CEFAZOLIN: SIGNIFICANT CHANGE UP
-  CEFEPIME: SIGNIFICANT CHANGE UP
-  CEFOXITIN: SIGNIFICANT CHANGE UP
-  CEFTRIAXONE: SIGNIFICANT CHANGE UP
-  CIPROFLOXACIN: SIGNIFICANT CHANGE UP
-  ERTAPENEM: SIGNIFICANT CHANGE UP
-  GENTAMICIN: SIGNIFICANT CHANGE UP
-  LEVOFLOXACIN: SIGNIFICANT CHANGE UP
-  MEROPENEM: SIGNIFICANT CHANGE UP
-  NITROFURANTOIN: SIGNIFICANT CHANGE UP
-  PIPERACILLIN/TAZOBACTAM: SIGNIFICANT CHANGE UP
-  TOBRAMYCIN: SIGNIFICANT CHANGE UP
-  TRIMETHOPRIM/SULFAMETHOXAZOLE: SIGNIFICANT CHANGE UP
CULTURE RESULTS: SIGNIFICANT CHANGE UP
HCT VFR BLD CALC: 36.9 % — SIGNIFICANT CHANGE UP (ref 34.5–45)
HGB BLD-MCNC: 11.7 G/DL — SIGNIFICANT CHANGE UP (ref 11.5–15.5)
MCHC RBC-ENTMCNC: 27.9 PG — SIGNIFICANT CHANGE UP (ref 27–34)
MCHC RBC-ENTMCNC: 31.7 GM/DL — LOW (ref 32–36)
MCV RBC AUTO: 87.9 FL — SIGNIFICANT CHANGE UP (ref 80–100)
METHOD TYPE: SIGNIFICANT CHANGE UP
NRBC # BLD: 0 /100 WBCS — SIGNIFICANT CHANGE UP (ref 0–0)
ORGANISM # SPEC MICROSCOPIC CNT: SIGNIFICANT CHANGE UP
ORGANISM # SPEC MICROSCOPIC CNT: SIGNIFICANT CHANGE UP
PLATELET # BLD AUTO: 341 K/UL — SIGNIFICANT CHANGE UP (ref 150–400)
RBC # BLD: 4.2 M/UL — SIGNIFICANT CHANGE UP (ref 3.8–5.2)
RBC # FLD: 13.4 % — SIGNIFICANT CHANGE UP (ref 10.3–14.5)
SPECIMEN SOURCE: SIGNIFICANT CHANGE UP
WBC # BLD: 13.91 K/UL — HIGH (ref 3.8–10.5)
WBC # FLD AUTO: 13.91 K/UL — HIGH (ref 3.8–10.5)

## 2019-03-16 RX ADMIN — Medication 3 MILLILITER(S): at 01:07

## 2019-03-16 RX ADMIN — SENNA PLUS 2 TABLET(S): 8.6 TABLET ORAL at 21:55

## 2019-03-16 RX ADMIN — Medication 3 MILLILITER(S): at 21:00

## 2019-03-16 RX ADMIN — CEFEPIME 1000 MILLIGRAM(S): 1 INJECTION, POWDER, FOR SOLUTION INTRAMUSCULAR; INTRAVENOUS at 05:05

## 2019-03-16 RX ADMIN — CEFEPIME 1000 MILLIGRAM(S): 1 INJECTION, POWDER, FOR SOLUTION INTRAMUSCULAR; INTRAVENOUS at 18:14

## 2019-03-16 RX ADMIN — AZITHROMYCIN 500 MILLIGRAM(S): 500 TABLET, FILM COATED ORAL at 13:04

## 2019-03-16 NOTE — PROGRESS NOTE ADULT - SUBJECTIVE AND OBJECTIVE BOX
cc: cough, weakness  88 y/o female with h/o dementia, chronic back pain, left foot drop presents from [facility] due to weakness and sob.  associated with fever, 101F, decreased po intake.  much of history provided by daughter and chart due to dementia.  states patient hasnt been progressing well with PT after hip replacement.   is also admitted in [facility] for similar symptoms. In ED, noted to have b/l pna. s/p vanco/cefepime.    3/14: Pt lying in bed, pleasantly confused and offers no complaints. Spoke to daughter at bedside and discussed diagnosis and plan with her.  3/15: Lying in bed, comfortable.  No fever, chills, n, v.  03/16/19: Patient seen and examined. Feels weak and tired. Discussed with daughter at bed side regarding management plan.     REVIEW OF SYSTEMS: All other review of systems is negative unless indicated above.      Vital Signs Last 24 Hrs  T(C): 37.1 (16 Mar 2019 04:13), Max: 37.1 (15 Mar 2019 20:41)  T(F): 98.7 (16 Mar 2019 04:13), Max: 98.7 (15 Mar 2019 20:41)  HR: 101 (16 Mar 2019 04:13) (76 - 101)  BP: 149/64 (16 Mar 2019 04:13) (149/64 - 151/66)  BP(mean): --  RR: 18 (16 Mar 2019 04:13) (18 - 18)  SpO2: 98% (16 Mar 2019 04:13) (96% - 98%).        PHYSICAL EXAM:    Constitutional: NAD, awake and alert, frail, cachectic,   HEENT: PERR, EOMI, Normal Hearing, MMM, temporal wasting   Neck: Soft and supple  Respiratory: Breath sounds are clear bilaterally, No wheezing, rales or rhonchi  Cardiovascular: S1 and S2, regular rate and rhythm, no Murmurs, gallops or rubs  Gastrointestinal: Bowel Sounds present, soft, nontender, nondistended, no guarding, no rebound  Extremities: No peripheral edema  Neurological: A/O x 3, no focal deficits in my limited exam  Skin: No rashes          Labs:                             11.7   13.91 )-----------( 341      ( 16 Mar 2019 09:36 )             36.9     15 Mar 2019 06:58    139    |  105    |  18     ----------------------------<  108    4.1     |  24     |  0.66     Ca    8.7        15 Mar 2019 06:58          MEDICATIONS:    ALBUTerol    90 MICROgram(s) HFA Inhaler 1 Puff(s) Inhalation every 4 hours  ALBUTerol/ipratropium for Nebulization 3 milliLiter(s) Nebulizer every 6 hours  azithromycin   Tablet 500 milliGRAM(s) Oral daily  cefepime  Injectable. 1000 milliGRAM(s) IV Push every 12 hours  mirtazapine 7.5 milliGRAM(s) Oral at bedtime  senna 2 Tablet(s) Oral at bedtime  tiotropium 18 MICROgram(s) Capsule 1 Capsule(s) Inhalation daily    MEDICATIONS  (PRN):  acetaminophen   Tablet .. 500 milliGRAM(s) Oral every 8 hours PRN Temp greater or equal to 38C (100.4F), Mild Pain (1 - 3)                  Assessment and Plan:  88 y/o female    sob/cough: sepsis secondary to acute bacterial pneumonia and UTI  -cefepime and azithro per ID for probable gram neg bacteria and atypical   -duoneb q6h and albuterol prn   -RVP neg  -supportive care  -f/u cultures    t wave inversions on ecg, d/w cardiology-no plan for cath at this time,   -holding asa/plavix, statin, bb  -monitor    Left Hip Hemiarthroplasty: stable    advanced Dementia, malnutrition hypoalbuminemia:  -mentation at baseline   -supportive care  -nutrition consult    dvt prophylaxis   -lovenox sc     Dispo:  Once stable

## 2019-03-17 LAB
HCT VFR BLD CALC: 37.2 % — SIGNIFICANT CHANGE UP (ref 34.5–45)
HGB BLD-MCNC: 11.5 G/DL — SIGNIFICANT CHANGE UP (ref 11.5–15.5)
MCHC RBC-ENTMCNC: 28 PG — SIGNIFICANT CHANGE UP (ref 27–34)
MCHC RBC-ENTMCNC: 30.9 GM/DL — LOW (ref 32–36)
MCV RBC AUTO: 90.7 FL — SIGNIFICANT CHANGE UP (ref 80–100)
NRBC # BLD: 0 /100 WBCS — SIGNIFICANT CHANGE UP (ref 0–0)
PLATELET # BLD AUTO: 354 K/UL — SIGNIFICANT CHANGE UP (ref 150–400)
RBC # BLD: 4.1 M/UL — SIGNIFICANT CHANGE UP (ref 3.8–5.2)
RBC # FLD: 13.5 % — SIGNIFICANT CHANGE UP (ref 10.3–14.5)
WBC # BLD: 14.3 K/UL — HIGH (ref 3.8–10.5)
WBC # FLD AUTO: 14.3 K/UL — HIGH (ref 3.8–10.5)

## 2019-03-17 RX ADMIN — Medication 3 MILLILITER(S): at 07:45

## 2019-03-17 RX ADMIN — Medication 3 MILLILITER(S): at 13:38

## 2019-03-17 RX ADMIN — CEFEPIME 1000 MILLIGRAM(S): 1 INJECTION, POWDER, FOR SOLUTION INTRAMUSCULAR; INTRAVENOUS at 05:18

## 2019-03-17 RX ADMIN — Medication 3 MILLILITER(S): at 01:17

## 2019-03-17 RX ADMIN — SENNA PLUS 2 TABLET(S): 8.6 TABLET ORAL at 22:14

## 2019-03-17 RX ADMIN — AZITHROMYCIN 500 MILLIGRAM(S): 500 TABLET, FILM COATED ORAL at 15:09

## 2019-03-17 RX ADMIN — CEFEPIME 1000 MILLIGRAM(S): 1 INJECTION, POWDER, FOR SOLUTION INTRAMUSCULAR; INTRAVENOUS at 17:15

## 2019-03-17 NOTE — PROGRESS NOTE ADULT - SUBJECTIVE AND OBJECTIVE BOX
cc: cough, weakness  88 y/o female with h/o dementia, chronic back pain, left foot drop presents from [facility] due to weakness and sob.  associated with fever, 101F, decreased po intake.  much of history provided by daughter and chart due to dementia.  states patient hasnt been progressing well with PT after hip replacement.   is also admitted in [facility] for similar symptoms. In ED, noted to have b/l pna. s/p vanco/cefepime.    3/14: Pt lying in bed, pleasantly confused and offers no complaints. Spoke to daughter at bedside and discussed diagnosis and plan with her.  3/15: Lying in bed, comfortable.  No fever, chills, n, v.  03/16/19: Patient seen and examined. Feels weak and tired. Discussed with daughter at bed side regarding management plan.   03/17/19: No new complaints.     REVIEW OF SYSTEMS: All other review of systems is negative unless indicated above.      Vital Signs Last 24 Hrs  T(C): 36.5 (17 Mar 2019 12:06), Max: 36.8 (16 Mar 2019 21:53)  T(F): 97.7 (17 Mar 2019 12:06), Max: 98.2 (16 Mar 2019 21:53)  HR: 82 (17 Mar 2019 13:35) (80 - 115)  BP: 128/68 (17 Mar 2019 12:06) (122/60 - 145/61)  BP(mean): --  RR: 16 (17 Mar 2019 12:06) (16 - 18)  SpO2: 97% (17 Mar 2019 12:06) (96% - 98%)        PHYSICAL EXAM:    Constitutional: NAD, awake and alert, frail, cachectic,   HEENT: PERR, EOMI, Normal Hearing, MMM, temporal wasting   Neck: Soft and supple  Respiratory: Breath sounds are clear bilaterally, No wheezing, rales or rhonchi  Cardiovascular: S1 and S2, regular rate and rhythm, no Murmurs, gallops or rubs  Gastrointestinal: Bowel Sounds present, soft, nontender, nondistended, no guarding, no rebound  Extremities: No peripheral edema  Neurological: A/O x 3, no focal deficits in my limited exam  Skin: No rashes          Labs:                                               11.5   14.30 )-----------( 354      ( 17 Mar 2019 07:39 )             37.2       MEDICATIONS:    ALBUTerol    90 MICROgram(s) HFA Inhaler 1 Puff(s) Inhalation every 4 hours  ALBUTerol/ipratropium for Nebulization 3 milliLiter(s) Nebulizer every 6 hours  azithromycin   Tablet 500 milliGRAM(s) Oral daily  cefepime  Injectable. 1000 milliGRAM(s) IV Push every 12 hours  mirtazapine 7.5 milliGRAM(s) Oral at bedtime  senna 2 Tablet(s) Oral at bedtime  tiotropium 18 MICROgram(s) Capsule 1 Capsule(s) Inhalation daily    MEDICATIONS  (PRN):  acetaminophen   Tablet .. 500 milliGRAM(s) Oral every 8 hours PRN Temp greater or equal to 38C (100.4F), Mild Pain (1 - 3)                  Assessment and Plan:  88 y/o female    sob/cough: sepsis secondary to acute bacterial pneumonia and proteus UTI  -cefepime and azithro per ID for probable gram neg bacteria and atypical   -duoneb q6h and albuterol prn   -RVP neg  -supportive care  -f/u cultures: BC no growth    t wave inversions on ecg, d/w cardiology-no plan for cath at this time,   -holding asa/plavix, statin, bb  -monitor    Left Hip Hemiarthroplasty: stable    advanced Dementia, malnutrition hypoalbuminemia:  -mentation at baseline   -supportive care  -nutrition consult    dvt prophylaxis   -lovenox sc     Dispo:  Once stable

## 2019-03-18 LAB
HCT VFR BLD CALC: 36.7 % — SIGNIFICANT CHANGE UP (ref 34.5–45)
HGB BLD-MCNC: 11.3 G/DL — LOW (ref 11.5–15.5)
MCHC RBC-ENTMCNC: 28.2 PG — SIGNIFICANT CHANGE UP (ref 27–34)
MCHC RBC-ENTMCNC: 30.8 GM/DL — LOW (ref 32–36)
MCV RBC AUTO: 91.5 FL — SIGNIFICANT CHANGE UP (ref 80–100)
NRBC # BLD: 0 /100 WBCS — SIGNIFICANT CHANGE UP (ref 0–0)
PLATELET # BLD AUTO: 304 K/UL — SIGNIFICANT CHANGE UP (ref 150–400)
RBC # BLD: 4.01 M/UL — SIGNIFICANT CHANGE UP (ref 3.8–5.2)
RBC # FLD: 13.5 % — SIGNIFICANT CHANGE UP (ref 10.3–14.5)
WBC # BLD: 12.89 K/UL — HIGH (ref 3.8–10.5)
WBC # FLD AUTO: 12.89 K/UL — HIGH (ref 3.8–10.5)

## 2019-03-18 RX ORDER — HEPARIN SODIUM 5000 [USP'U]/ML
5000 INJECTION INTRAVENOUS; SUBCUTANEOUS EVERY 12 HOURS
Qty: 0 | Refills: 0 | Status: DISCONTINUED | OUTPATIENT
Start: 2019-03-18 | End: 2019-03-22

## 2019-03-18 RX ORDER — ACETAMINOPHEN 500 MG
1000 TABLET ORAL ONCE
Qty: 0 | Refills: 0 | Status: COMPLETED | OUTPATIENT
Start: 2019-03-18 | End: 2019-03-18

## 2019-03-18 RX ADMIN — AZITHROMYCIN 500 MILLIGRAM(S): 500 TABLET, FILM COATED ORAL at 12:18

## 2019-03-18 RX ADMIN — Medication 3 MILLILITER(S): at 07:51

## 2019-03-18 RX ADMIN — Medication 3 MILLILITER(S): at 13:18

## 2019-03-18 RX ADMIN — CEFEPIME 1000 MILLIGRAM(S): 1 INJECTION, POWDER, FOR SOLUTION INTRAMUSCULAR; INTRAVENOUS at 17:01

## 2019-03-18 RX ADMIN — Medication 500 MILLIGRAM(S): at 17:44

## 2019-03-18 RX ADMIN — CEFEPIME 1000 MILLIGRAM(S): 1 INJECTION, POWDER, FOR SOLUTION INTRAMUSCULAR; INTRAVENOUS at 05:36

## 2019-03-18 RX ADMIN — HEPARIN SODIUM 5000 UNIT(S): 5000 INJECTION INTRAVENOUS; SUBCUTANEOUS at 17:01

## 2019-03-18 RX ADMIN — Medication 3 MILLILITER(S): at 20:37

## 2019-03-18 RX ADMIN — Medication 400 MILLIGRAM(S): at 22:57

## 2019-03-18 NOTE — PROGRESS NOTE ADULT - ASSESSMENT
86 y/o female with h/o dementia, chronic back pain, left foot drop, left hip surgery, laminectomy was admitted on 3/13 from [facility] for increased weakness, SOB, fever to 101F, decreased PO intake. The patient had a recent left hip replacement undergoing PT therapy. In ER, she received cefepime and vancomycin IV.    1. Low grade fever. B/l Lower lobes pneumonia. ?viral vs bacterial etiology. Pyuria. Probable UTI. Encephalopathy.  -mild leukocytosis  -f/u BC x 2, urine c/s  -on cefepime 1 gm IV q12h and azithromycin 500 mg PO qd # 5  -tolerating abx well so far; no side effects noted  -respiratory care  -d/c azithromycin  -continue IV abx coverage with cefepime  -monitor temps  -f/u CBC  -supportive care  2. Other issues:   -care per medicine 88 y/o female with h/o dementia, chronic back pain, left foot drop, left hip surgery, laminectomy was admitted on 3/13 from [facility] for increased weakness, SOB, fever to 101F, decreased PO intake. The patient had a recent left hip replacement undergoing PT therapy. In ER, she received cefepime and vancomycin IV.    1. Low grade fever. B/l Lower lobes pneumonia. ?viral vs bacterial etiology. Pyuria. UTI with PRMI. Encephalopathy.  -mild leukocytosis  -f/u BC x 2, urine c/s  -on cefepime 1 gm IV q12h and azithromycin 500 mg PO qd # 5  -tolerating abx well so far; no side effects noted  -respiratory care  -d/c azithromycin  -continue IV abx coverage with cefepime  -monitor temps  -f/u CBC  -supportive care  2. Other issues:   -care per medicine

## 2019-03-18 NOTE — PROGRESS NOTE ADULT - SUBJECTIVE AND OBJECTIVE BOX
Cardiology Progress Note  Patient is a 87y old  Female who presents with a chief complaint of cough, weakness.     HPI: 86 y/o female with h/o dementia, chronic back pain, left foot drop presents from [facility] due to weakness and sob.  Associated with fever, 101F, decreased po intake.  Much of history provided by daughter at presentation to the admitting physician and chart due to dementia.  States patient hasn't been progressing well with PT after hip replacement.    Pt seen and examined by me this am. She was unable to provide any answered to any questions. Appears to be comfortable and was mumbling.   No overnight events per staff.   In ED, noted to have b/l pna. s/p vanco/cefepime.    3/15-p t seen and examined this am. No overnight issues per staff.  No distress. pt not verbal and she is able to mumble. Confused.     3/18. Chart reviewed. No events last pm. Pt is nonverbal, confused.   No CP/SOB. Lying flat in bed.     PAST MEDICAL HISTORY:  dementia, right foot drop  PAST SURGICAL HISTORY: laminectomyx2, left hip surgery  FAMILY HISTORY:   denies       PAST MEDICAL & SURGICAL HISTORY:  Dementia  History of lumbar laminectomy      MEDICATIONS  (STANDING):  ALBUTerol    90 MICROgram(s) HFA Inhaler 1 Puff(s) Inhalation every 4 hours  ALBUTerol/ipratropium for Nebulization 3 milliLiter(s) Nebulizer every 6 hours  azithromycin   Tablet 500 milliGRAM(s) Oral daily  cefepime  Injectable. 1000 milliGRAM(s) IV Push every 12 hours  mirtazapine 7.5 milliGRAM(s) Oral at bedtime  senna 2 Tablet(s) Oral at bedtime  tiotropium 18 MICROgram(s) Capsule 1 Capsule(s) Inhalation daily    MEDICATIONS  (PRN):  acetaminophen   Tablet .. 500 milliGRAM(s) Oral every 8 hours PRN Temp greater or equal to 38C (100.4F), Mild Pain (1 - 3)      FAMILY HISTORY:  Family history of cardiac disorder (Father)      SOCIAL HISTORY:  unable to obtain per pt.     REVIEW OF SYSTEMS:  per HPI.  Pt unable to provide any at this time        Vital Signs Last 24 Hrs  T(C): 37.2 (14 Mar 2019 11:45), Max: 37.9 (13 Mar 2019 21:41)  T(F): 98.9 (14 Mar 2019 11:45), Max: 100.2 (13 Mar 2019 21:41)  HR: 80 (14 Mar 2019 13:46) (80 - 95)  BP: 112/48 (14 Mar 2019 11:45) (112/48 - 153/58)  BP(mean): --  RR: 16 (14 Mar 2019 11:45) (16 - 23)  SpO2: 99% (14 Mar 2019 11:45) (96% - 99%)    PHYSICAL EXAM-    Constitutional: frail, very cachectic elderly female in no acute distress    Head: Head is normocephalic and atraumatic.      Neck: No jugular venous distention. No audible carotid bruits.    Cardiovascular: Regular rate and rhythm without S3, S4. No murmurs or rubs are appreciated.      Respiratory: Breath sounds are normal. No rales. No wheezing.    Abdomen: Soft, nontender, nondistended with positive bowel sounds.      Extremity: No tenderness. No  pitting edema     Neurologic: The patient is alert.    Skin: No rash, no obvious lesions noted.      Psychiatric: The patient appears to be emotionally stable.      INTERPRETATION OF TELEMETRY: not on     ECG: Sinus rythm , T wave inversion in V2-4, prolonged QT interval.  This was compared to her EKG from 18 which did not reveal any T wave inversions.     I&O's Detail      LABS:                        10.4   11.38 )-----------( 246      ( 14 Mar 2019 07:39 )             32.4     03-14    137  |  104  |  22  ----------------------------<  129<H>  3.9   |  23  |  0.57    Ca    8.3<L>      14 Mar 2019 07:39  Phos  3.0     03-14  Mg     1.8     03-14    TPro  6.4  /  Alb  2.1<L>  /  TBili  0.5  /  DBili  x   /  AST  20  /  ALT  57  /  AlkPhos  115  03-14    CARDIAC MARKERS ( 13 Mar 2019 22:46 )  <0.015 ng/mL / x     / x     / x     / x      CARDIAC MARKERS ( 13 Mar 2019 19:37 )  <0.015 ng/mL / x     / x     / x     / x      CARDIAC MARKERS ( 13 Mar 2019 13:22 )  <0.015 ng/mL / x     / x     / x     / x        Urinalysis Basic - ( 13 Mar 2019 23:09 )    Color: Yellow / Appearance: very cloudy / S.010 / pH: x  Gluc: x / Ketone: Negative  / Bili: Negative / Urobili: Negative mg/dL   Blood: x / Protein: 100 mg/dL / Nitrite: Positive   Leuk Esterase: Moderate / RBC: 0-2 /HPF / WBC >50   Sq Epi: x / Non Sq Epi: Negative / Bacteria: TNTC      I&O's Summary    BNP  RADIOLOGY & ADDITIONAL STUDIES: < from: CT Chest No Cont (19 @ 15:38) >  Bilateral lower lobe pneumonia and atelectasis left worse than right.   Small left pleural effusion.  Additional findings as discussed.    < from: 12 Lead ECG (03.15.19 @ 11:25) >   Sinus tachycardia  Moderate voltage criteria for LVH, may be normal variant  ST & Marked T wave abnormality, consider anterolateral ischemia  Abnormal ECG

## 2019-03-18 NOTE — PROGRESS NOTE ADULT - SUBJECTIVE AND OBJECTIVE BOX
Date of service: 19 @ 15:33    Lying in bed in NAD  No SOB at rest  Weak looking and lethargic    ROS: no fever or chills; poorly verbal    MEDICATIONS  (STANDING):  ALBUTerol    90 MICROgram(s) HFA Inhaler 1 Puff(s) Inhalation every 4 hours  ALBUTerol/ipratropium for Nebulization 3 milliLiter(s) Nebulizer every 6 hours  azithromycin   Tablet 500 milliGRAM(s) Oral daily  cefepime  Injectable. 1000 milliGRAM(s) IV Push every 12 hours  heparin  Injectable 5000 Unit(s) SubCutaneous every 12 hours  senna 2 Tablet(s) Oral at bedtime  tiotropium 18 MICROgram(s) Capsule 1 Capsule(s) Inhalation daily      Vital Signs Last 24 Hrs  T(C): 36.9 (18 Mar 2019 11:51), Max: 37.2 (18 Mar 2019 05:00)  T(F): 98.4 (18 Mar 2019 11:51), Max: 99 (18 Mar 2019 05:00)  HR: 109 (18 Mar 2019 13:10) (83 - 120)  BP: 115/61 (18 Mar 2019 11:51) (113/48 - 118/61)  BP(mean): --  RR: 17 (18 Mar 2019 11:51) (16 - 18)  SpO2: 98% (18 Mar 2019 11:51) (95% - 98%)    Physical Exam:      Constitutional: frail looking  HEENT: NC/AT, EOMI, PERRLA, conjunctivae clear  Neck: supple; thyroid not palpable  Back: no tenderness  Respiratory: respiratory effort normal; crackles at bases  Cardiovascular: S1S2 regular, no murmurs  Abdomen: soft, not tender, not distended, positive BS  Genitourinary: no suprapubic tenderness  Lymphatic: no LN palpable  Musculoskeletal: no muscle tenderness, no joint swelling or tenderness  Extremities: no pedal edema  Neurological/ Psychiatric: confused, moving all extremities  Skin: no rashes; no palpable lesions    Labs: reviewed                        11.3   12.89 )-----------( 304      ( 18 Mar 2019 07:27 )             36.7                         11.3   13.28 )-----------( 295      ( 15 Mar 2019 06:58 )             35.7     03-15    139  |  105  |  18  ----------------------------<  108<H>  4.1   |  24  |  0.66    Ca    8.7      15 Mar 2019 06:58  Phos  3.0     03-14  Mg     1.8     -14    TPro  6.4  /  Alb  2.1<L>  /  TBili  0.5  /  DBili  x   /  AST  20  /  ALT  57  /  AlkPhos  115  14                        10.4   11.38 )-----------( 246      ( 14 Mar 2019 07:39 )             32.4     -14    137  |  104  |  22  ----------------------------<  129<H>  3.9   |  23  |  0.57    Ca    8.3<L>      14 Mar 2019 07:39  Phos  3.0     03-14  Mg     1.8     -14    TPro  6.4  /  Alb  2.1<L>  /  TBili  0.5  /  DBili  x   /  AST  20  /  ALT  57  /  AlkPhos  115  14     LIVER FUNCTIONS - ( 14 Mar 2019 07:39 )  Alb: 2.1 g/dL / Pro: 6.4 gm/dL / ALK PHOS: 115 U/L / ALT: 57 U/L / AST: 20 U/L / GGT: x           Urinalysis Basic - ( 13 Mar 2019 23:09 )    Color: Yellow / Appearance: very cloudy / S.010 / pH: x  Gluc: x / Ketone: Negative  / Bili: Negative / Urobili: Negative mg/dL   Blood: x / Protein: 100 mg/dL / Nitrite: Positive   Leuk Esterase: Moderate / RBC: 0-2 /HPF / WBC >50   Sq Epi: x / Non Sq Epi: Negative / Bacteria: TNTC    Rapid RVP Result: NotDetec: This Respiratory Panel uses polymerase chain reaction (PCR) to detect for  adenovirus; coronavirus (HKU1, NL63, 229E, OC43); human metapneumovirus  (hMPV); human enterovirus/rhinovirus (Entero/RV); influenza A; influenza  A/H1; influenza A/H3; influenza A/H1-2009; influenza B; parainfluenza  viruses 1, 2, 3, 4; respiratory syncytial virus; Mycoplasma pneumoniae;  and Chlamydophila pneumoniae. (19 @ 19:37)      Culture - Blood (collected 13 Mar 2019 17:54)  Source: .Blood None  Preliminary Report (15 Mar 2019 02:02):    No growth to date.    Culture - Blood (collected 13 Mar 2019 17:54)  Source: .Blood None  Preliminary Report (15 Mar 2019 02:02):    No growth to date.    Radiology: all available radiological tests reviewed    < from: CT Chest No Cont (19 @ 15:38) >  Bilateral lower lobe pneumonia and atelectasis left worse than right.   Small left pleural effusion.    < end of copied text >      Advanced directives addressed: full resuscitation Date of service: 19 @ 15:33    Lying in bed in NAD  No SOB at rest  Weak looking and lethargic    ROS: no fever or chills; poorly verbal    MEDICATIONS  (STANDING):  ALBUTerol    90 MICROgram(s) HFA Inhaler 1 Puff(s) Inhalation every 4 hours  ALBUTerol/ipratropium for Nebulization 3 milliLiter(s) Nebulizer every 6 hours  azithromycin   Tablet 500 milliGRAM(s) Oral daily  cefepime  Injectable. 1000 milliGRAM(s) IV Push every 12 hours  heparin  Injectable 5000 Unit(s) SubCutaneous every 12 hours  senna 2 Tablet(s) Oral at bedtime  tiotropium 18 MICROgram(s) Capsule 1 Capsule(s) Inhalation daily      Vital Signs Last 24 Hrs  T(C): 36.9 (18 Mar 2019 11:51), Max: 37.2 (18 Mar 2019 05:00)  T(F): 98.4 (18 Mar 2019 11:51), Max: 99 (18 Mar 2019 05:00)  HR: 109 (18 Mar 2019 13:10) (83 - 120)  BP: 115/61 (18 Mar 2019 11:51) (113/48 - 118/61)  BP(mean): --  RR: 17 (18 Mar 2019 11:51) (16 - 18)  SpO2: 98% (18 Mar 2019 11:51) (95% - 98%)    Physical Exam:      Constitutional: frail looking  HEENT: NC/AT, EOMI, PERRLA, conjunctivae clear  Neck: supple; thyroid not palpable  Back: no tenderness  Respiratory: respiratory effort normal; crackles at bases  Cardiovascular: S1S2 regular, no murmurs  Abdomen: soft, not tender, not distended, positive BS  Genitourinary: no suprapubic tenderness  Lymphatic: no LN palpable  Musculoskeletal: no muscle tenderness, no joint swelling or tenderness  Extremities: no pedal edema  Neurological/ Psychiatric: confused, moving all extremities  Skin: no rashes; no palpable lesions    Labs: reviewed                        11.3   12.89 )-----------( 304      ( 18 Mar 2019 07:27 )             36.7                         11.3   13.28 )-----------( 295      ( 15 Mar 2019 06:58 )             35.7     03-15    139  |  105  |  18  ----------------------------<  108<H>  4.1   |  24  |  0.66    Ca    8.7      15 Mar 2019 06:58  Phos  3.0     03-14  Mg     1.8     03-14    TPro  6.4  /  Alb  2.1<L>  /  TBili  0.5  /  DBili  x   /  AST  20  /  ALT  57  /  AlkPhos  115  03-14                        10.4   11.38 )-----------( 246      ( 14 Mar 2019 07:39 )             32.4     03-14    137  |  104  |  22  ----------------------------<  129<H>  3.9   |  23  |  0.57    Ca    8.3<L>      14 Mar 2019 07:39  Phos  3.0     03-14  Mg     1.8     03-14    TPro  6.4  /  Alb  2.1<L>  /  TBili  0.5  /  DBili  x   /  AST  20  /  ALT  57  /  AlkPhos  115  03-14     LIVER FUNCTIONS - ( 14 Mar 2019 07:39 )  Alb: 2.1 g/dL / Pro: 6.4 gm/dL / ALK PHOS: 115 U/L / ALT: 57 U/L / AST: 20 U/L / GGT: x           Urinalysis Basic - ( 13 Mar 2019 23:09 )    Color: Yellow / Appearance: very cloudy / S.010 / pH: x  Gluc: x / Ketone: Negative  / Bili: Negative / Urobili: Negative mg/dL   Blood: x / Protein: 100 mg/dL / Nitrite: Positive   Leuk Esterase: Moderate / RBC: 0-2 /HPF / WBC >50   Sq Epi: x / Non Sq Epi: Negative / Bacteria: TNTC    Rapid RVP Result: NotDetec    Culture - Blood (collected 13 Mar 2019 17:54)  Source: .Blood None  Preliminary Report (15 Mar 2019 02:02):    No growth to date.    Culture - Blood (collected 13 Mar 2019 17:54)  Source: .Blood None  Preliminary Report (15 Mar 2019 02:02):    No growth to date.    Culture - Urine (19 @ 23:09)    -  Amikacin: S <=16    -  Ampicillin: R >16 These ampicillin results predict results for amoxicillin    -  Ampicillin/Sulbactam: S <=8/4    -  Aztreonam: S <=4    -  Cefazolin: S <=8 For uncomplicated UTI with K. pneumoniae, E. coli, or P. mirablis: TEENA <=16 is sensitive and TEENA >=32 is resistant. This also predicts results for oral agents cefaclor, cefdinir, cefpodoxime, cefprozil, cefuroxime axetil, cephalexin and locarbef for uncomplicated UTI. Note that some isolates may be susceptible to these agents while testing resistant to cefazolin.    -  Cefepime: S <=4    -  Cefoxitin: S <=8    -  Ceftriaxone: S <=1 Enterobacter, Citrobacter, and Serratia may develop resistance during prolonged therapy    -  Ciprofloxacin: R >2    -  Ertapenem: S <=1    -  Gentamicin: S <=4    -  Levofloxacin: I 4    -  Meropenem: S <=1    -  Nitrofurantoin: R >64 Should not be used to treat pyelonephritis    -  Piperacillin/Tazobactam: S <=16    -  Tobramycin: S <=4    -  Trimethoprim/Sulfamethoxazole: R >2/38    Specimen Source: .Urine None    Culture Results:   50,000 - 99,000 CFU/mL Proteus mirabilis    Organism Identification: Proteus mirabilis    Organism: Proteus mirabilis    Method Type: TEENA    Radiology: all available radiological tests reviewed    < from: CT Chest No Cont (19 @ 15:38) >  Bilateral lower lobe pneumonia and atelectasis left worse than right.   Small left pleural effusion.    < end of copied text >      Advanced directives addressed: full resuscitation

## 2019-03-18 NOTE — PROGRESS NOTE ADULT - ASSESSMENT
1. Abnormal EKG- there are deep t wave inversions noted in the precordial leads which many times could be related to CNS pathology.   No evidence of ischemia - so Cardiac enzymes negative.   No intracranial pathology per CT head.  Close monitoring of the electrolytes.   Close monitoring for now.       2. Pneumonia- Management per primary team.    3. Prolonged QT interval- avoid agents that can prolong the QT interval.  Mirtazepine can cause prolonged QT interval as <1% adverse effect.  Maintain K of 4 and mag of 2.    4. Continue conservative medical therapy. Pt has no active CP or signs of decompensation at present.   Can start low dose asa/statin. BP/HR well controlled.

## 2019-03-18 NOTE — PROGRESS NOTE ADULT - SUBJECTIVE AND OBJECTIVE BOX
cc: cough, weakness  88 y/o female with h/o dementia, chronic back pain, left foot drop presents from [facility] due to weakness and sob.  associated with fever, 101F, decreased po intake.  much of history provided by daughter and chart due to dementia.  states patient hasnt been progressing well with PT after hip replacement.   is also admitted in [facility] for similar symptoms. In ED, noted to have b/l pna. s/p vanco/cefepime.    3/14: Pt lying in bed, pleasantly confused and offers no complaints. Spoke to daughter at bedside and discussed diagnosis and plan with her.  3/15: Lying in bed, comfortable.  No fever, chills, n, v.  03/16/19: Patient seen and examined. Feels weak and tired. Discussed with daughter at bed side regarding management plan.   03/17/19: No new complaints.   03/18/19: Patient seen and examined. No active issues.     REVIEW OF SYSTEMS: All other review of systems is negative unless indicated above.      Vital Signs Last 24 Hrs  T(C): 36.9 (18 Mar 2019 11:51), Max: 37.2 (18 Mar 2019 05:00)  T(F): 98.4 (18 Mar 2019 11:51), Max: 99 (18 Mar 2019 05:00)  HR: 115 (18 Mar 2019 11:51) (83 - 120)  BP: 115/61 (18 Mar 2019 11:51) (113/48 - 118/61)  BP(mean): --  RR: 17 (18 Mar 2019 11:51) (16 - 18)  SpO2: 98% (18 Mar 2019 11:51) (95% - 98%)        PHYSICAL EXAM:    Constitutional: NAD, awake and alert, frail, cachectic,   HEENT: PERR, EOMI, Normal Hearing, MMM, temporal wasting   Neck: Soft and supple  Respiratory: Breath sounds are clear bilaterally, No wheezing, rales or rhonchi  Cardiovascular: S1 and S2, regular rate and rhythm, no Murmurs, gallops or rubs  Gastrointestinal: Bowel Sounds present, soft, nontender, nondistended, no guarding, no rebound  Extremities: No peripheral edema  Neurological: A/O x 3, no focal deficits in my limited exam  Skin: No rashes          Labs:                                    11.3   12.89 )-----------( 304      ( 18 Mar 2019 07:27 )             36.7             MEDICATIONS:          MEDICATIONS  (STANDING):  ALBUTerol    90 MICROgram(s) HFA Inhaler 1 Puff(s) Inhalation every 4 hours  ALBUTerol/ipratropium for Nebulization 3 milliLiter(s) Nebulizer every 6 hours  azithromycin   Tablet 500 milliGRAM(s) Oral daily  cefepime  Injectable. 1000 milliGRAM(s) IV Push every 12 hours  heparin  Injectable 5000 Unit(s) SubCutaneous every 12 hours  senna 2 Tablet(s) Oral at bedtime  tiotropium 18 MICROgram(s) Capsule 1 Capsule(s) Inhalation daily    MEDICATIONS  (PRN):  acetaminophen   Tablet .. 500 milliGRAM(s) Oral every 8 hours PRN Temp greater or equal to 38C (100.4F), Mild Pain (1 - 3)          Assessment and Plan:  88 y/o female    sob/cough: sepsis secondary to acute bacterial pneumonia and proteus UTI  -cefepime and azithro per ID for probable gram neg bacteria and atypical   -duoneb q6h and albuterol prn   -RVP neg  -supportive care  -f/u cultures: BC no growth    t wave inversions on ecg, d/w cardiology-no plan for cath at this time,   -holding asa/plavix, statin, bb  -monitor    Left Hip Hemiarthroplasty: stable    advanced Dementia, malnutrition hypoalbuminemia:  -mentation at baseline   -supportive care  -nutrition consult apprecaited    dvt prophylaxis     Dispo:  Once stable

## 2019-03-19 LAB
CULTURE RESULTS: SIGNIFICANT CHANGE UP
CULTURE RESULTS: SIGNIFICANT CHANGE UP
SPECIMEN SOURCE: SIGNIFICANT CHANGE UP
SPECIMEN SOURCE: SIGNIFICANT CHANGE UP

## 2019-03-19 RX ADMIN — HEPARIN SODIUM 5000 UNIT(S): 5000 INJECTION INTRAVENOUS; SUBCUTANEOUS at 05:51

## 2019-03-19 RX ADMIN — CEFEPIME 1000 MILLIGRAM(S): 1 INJECTION, POWDER, FOR SOLUTION INTRAMUSCULAR; INTRAVENOUS at 17:16

## 2019-03-19 RX ADMIN — Medication 3 MILLILITER(S): at 12:07

## 2019-03-19 RX ADMIN — HEPARIN SODIUM 5000 UNIT(S): 5000 INJECTION INTRAVENOUS; SUBCUTANEOUS at 17:17

## 2019-03-19 RX ADMIN — CEFEPIME 1000 MILLIGRAM(S): 1 INJECTION, POWDER, FOR SOLUTION INTRAMUSCULAR; INTRAVENOUS at 05:52

## 2019-03-19 NOTE — PROGRESS NOTE ADULT - SUBJECTIVE AND OBJECTIVE BOX
Cardiology Progress Note  Patient is a 87y old  Female who presents with a chief complaint of cough, weakness.     HPI: 86 y/o female with h/o dementia, chronic back pain, left foot drop presents from [facility] due to weakness and sob.  Associated with fever, 101F, decreased po intake.  Much of history provided by daughter at presentation to the admitting physician and chart due to dementia.  States patient hasn't been progressing well with PT after hip replacement.    Pt seen and examined by me this am. She was unable to provide any answered to any questions. Appears to be comfortable and was mumbling.   No overnight events per staff.   In ED, noted to have b/l pna. s/p vanco/cefepime.    3/15-p t seen and examined this am. No overnight issues per staff.  No distress. pt not verbal and she is able to mumble. Confused.     3/18. Chart reviewed. No events last pm. Pt is nonverbal, confused.   No CP/SOB. Lying flat in bed.     3/19. No events last pm. Confused. Not on tele. No CP.    PAST MEDICAL HISTORY:  dementia, right foot drop  PAST SURGICAL HISTORY: laminectomyx2, left hip surgery  FAMILY HISTORY:   denies       PAST MEDICAL & SURGICAL HISTORY:  Dementia  History of lumbar laminectomy      MEDICATIONS  (STANDING):  ALBUTerol    90 MICROgram(s) HFA Inhaler 1 Puff(s) Inhalation every 4 hours  ALBUTerol/ipratropium for Nebulization 3 milliLiter(s) Nebulizer every 6 hours  azithromycin   Tablet 500 milliGRAM(s) Oral daily  cefepime  Injectable. 1000 milliGRAM(s) IV Push every 12 hours  mirtazapine 7.5 milliGRAM(s) Oral at bedtime  senna 2 Tablet(s) Oral at bedtime  tiotropium 18 MICROgram(s) Capsule 1 Capsule(s) Inhalation daily    MEDICATIONS  (PRN):  acetaminophen   Tablet .. 500 milliGRAM(s) Oral every 8 hours PRN Temp greater or equal to 38C (100.4F), Mild Pain (1 - 3)      FAMILY HISTORY:  Family history of cardiac disorder (Father)      SOCIAL HISTORY:  unable to obtain per pt.     REVIEW OF SYSTEMS:  per HPI.  Pt unable to provide any at this time        Vital Signs Last 24 Hrs  T(C): 37.2 (14 Mar 2019 11:45), Max: 37.9 (13 Mar 2019 21:41)  T(F): 98.9 (14 Mar 2019 11:45), Max: 100.2 (13 Mar 2019 21:41)  HR: 80 (14 Mar 2019 13:46) (80 - 95)  BP: 112/48 (14 Mar 2019 11:45) (112/48 - 153/58)  BP(mean): --  RR: 16 (14 Mar 2019 11:45) (16 - 23)  SpO2: 99% (14 Mar 2019 11:45) (96% - 99%)    PHYSICAL EXAM-    Constitutional: frail, very cachectic elderly female in no acute distress    Head: Head is normocephalic and atraumatic.      Neck: No jugular venous distention. No audible carotid bruits.    Cardiovascular: Regular rate and rhythm without S3, S4. No murmurs or rubs are appreciated.      Respiratory: Breath sounds are normal. No rales. No wheezing.    Abdomen: Soft, nontender, nondistended with positive bowel sounds.      Extremity: No tenderness. No  pitting edema     Neurologic: The patient is alert.    Skin: No rash, no obvious lesions noted.      Psychiatric: The patient appears to be emotionally stable.      INTERPRETATION OF TELEMETRY: not on     ECG: Sinus rythm , T wave inversion in V2-4, prolonged QT interval.  This was compared to her EKG from 18 which did not reveal any T wave inversions.     I&O's Detail      LABS:                        10.4   11.38 )-----------( 246      ( 14 Mar 2019 07:39 )             32.4     03-14    137  |  104  |  22  ----------------------------<  129<H>  3.9   |  23  |  0.57    Ca    8.3<L>      14 Mar 2019 07:39  Phos  3.0     03-14  Mg     1.8     03-14    TPro  6.4  /  Alb  2.1<L>  /  TBili  0.5  /  DBili  x   /  AST  20  /  ALT  57  /  AlkPhos  115  03-14    CARDIAC MARKERS ( 13 Mar 2019 22:46 )  <0.015 ng/mL / x     / x     / x     / x      CARDIAC MARKERS ( 13 Mar 2019 19:37 )  <0.015 ng/mL / x     / x     / x     / x      CARDIAC MARKERS ( 13 Mar 2019 13:22 )  <0.015 ng/mL / x     / x     / x     / x        Urinalysis Basic - ( 13 Mar 2019 23:09 )    Color: Yellow / Appearance: very cloudy / S.010 / pH: x  Gluc: x / Ketone: Negative  / Bili: Negative / Urobili: Negative mg/dL   Blood: x / Protein: 100 mg/dL / Nitrite: Positive   Leuk Esterase: Moderate / RBC: 0-2 /HPF / WBC >50   Sq Epi: x / Non Sq Epi: Negative / Bacteria: TNTC      I&O's Summary    BNP  RADIOLOGY & ADDITIONAL STUDIES: < from: CT Chest No Cont (19 @ 15:38) >  Bilateral lower lobe pneumonia and atelectasis left worse than right.   Small left pleural effusion.  Additional findings as discussed.    < from: 12 Lead ECG (03.15.19 @ 11:25) >   Sinus tachycardia  Moderate voltage criteria for LVH, may be normal variant  ST & Marked T wave abnormality, consider anterolateral ischemia  Abnormal ECG

## 2019-03-19 NOTE — PROGRESS NOTE ADULT - ASSESSMENT
86 y/o female with h/o dementia, chronic back pain, left foot drop, left hip surgery, laminectomy was admitted on 3/13 from [facility] for increased weakness, SOB, fever to 101F, decreased PO intake. The patient had a recent left hip replacement undergoing PT therapy. In ER, she received cefepime and vancomycin IV.    1. Febrile syndrome. B/l Lower lobes pneumonia. Probable aspiration pneumonia. Pyuria. UTI with PRMI. Encephalopathy.  -concerned about aspiration   -mild leukocytosis  -f/u BC x 2, urine c/s  -on cefepime 1 gm IV q12h # 6  -tolerating abx well so far; no side effects noted  -respiratory care  -repeat CXR  -aspiration precautions  -continue IV abx coverage   -monitor temps  -f/u CBC  -supportive care  2. Other issues:   -care per medicine

## 2019-03-19 NOTE — PROGRESS NOTE ADULT - SUBJECTIVE AND OBJECTIVE BOX
Date of service: 19 @ 11:37    Lying in bed in NAD  No SOB at rest  Has dry cough  Still febrile    ROS: denies dizziness, no HA, no abdominal pain, no diarrhea or constipation; no dysuria, no legs pain, no rashes    MEDICATIONS  (STANDING):  ALBUTerol    90 MICROgram(s) HFA Inhaler 1 Puff(s) Inhalation every 4 hours  ALBUTerol/ipratropium for Nebulization 3 milliLiter(s) Nebulizer every 6 hours  cefepime  Injectable. 1000 milliGRAM(s) IV Push every 12 hours  heparin  Injectable 5000 Unit(s) SubCutaneous every 12 hours  senna 2 Tablet(s) Oral at bedtime  tiotropium 18 MICROgram(s) Capsule 1 Capsule(s) Inhalation daily      Vital Signs Last 24 Hrs  T(C): 36.8 (19 Mar 2019 11:02), Max: 38.4 (18 Mar 2019 17:17)  T(F): 98.3 (19 Mar 2019 11:02), Max: 101.1 (18 Mar 2019 17:17)  HR: 86 (19 Mar 2019 11:02) (54 - 115)  BP: 111/68 (19 Mar 2019 11:02) (99/49 - 136/50)  BP(mean): --  RR: 18 (19 Mar 2019 11:02) (17 - 18)  SpO2: 99% (19 Mar 2019 11:02) (93% - 99%)    Physical Exam:      Constitutional: frail looking  HEENT: NC/AT, EOMI, PERRLA, conjunctivae clear  Neck: supple; thyroid not palpable  Back: no tenderness  Respiratory: respiratory effort normal; crackles at bases  Cardiovascular: S1S2 regular, no murmurs  Abdomen: soft, not tender, not distended, positive BS  Genitourinary: no suprapubic tenderness  Lymphatic: no LN palpable  Musculoskeletal: no muscle tenderness, no joint swelling or tenderness  Extremities: no pedal edema  Neurological/ Psychiatric: confused, moving all extremities  Skin: no rashes; no palpable lesions    Labs: reviewed                        11.3   12.89 )-----------( 304      ( 18 Mar 2019 07:27 )             36.7                         11.3   13.28 )-----------( 295      ( 15 Mar 2019 06:58 )             35.7     03-15    139  |  105  |  18  ----------------------------<  108<H>  4.1   |  24  |  0.66    Ca    8.7      15 Mar 2019 06:58  Phos  3.0     03-14  Mg     1.8     03-14    TPro  6.4  /  Alb  2.1<L>  /  TBili  0.5  /  DBili  x   /  AST  20  /  ALT  57  /  AlkPhos  115  03-14                        10.4   11.38 )-----------( 246      ( 14 Mar 2019 07:39 )             32.4     03-14    137  |  104  |  22  ----------------------------<  129<H>  3.9   |  23  |  0.57    Ca    8.3<L>      14 Mar 2019 07:39  Phos  3.0     03-14  Mg     1.8     03-14    TPro  6.4  /  Alb  2.1<L>  /  TBili  0.5  /  DBili  x   /  AST  20  /  ALT  57  /  AlkPhos  115  03-14     LIVER FUNCTIONS - ( 14 Mar 2019 07:39 )  Alb: 2.1 g/dL / Pro: 6.4 gm/dL / ALK PHOS: 115 U/L / ALT: 57 U/L / AST: 20 U/L / GGT: x           Urinalysis Basic - ( 13 Mar 2019 23:09 )    Color: Yellow / Appearance: very cloudy / S.010 / pH: x  Gluc: x / Ketone: Negative  / Bili: Negative / Urobili: Negative mg/dL   Blood: x / Protein: 100 mg/dL / Nitrite: Positive   Leuk Esterase: Moderate / RBC: 0-2 /HPF / WBC >50   Sq Epi: x / Non Sq Epi: Negative / Bacteria: TNTC    Rapid RVP Result: NotDetec    Culture - Blood (collected 13 Mar 2019 17:54)  Source: .Blood None  Preliminary Report (15 Mar 2019 02:02):    No growth to date.    Culture - Blood (collected 13 Mar 2019 17:54)  Source: .Blood None  Preliminary Report (15 Mar 2019 02:02):    No growth to date.    Culture - Urine (19 @ 23:09)    -  Amikacin: S <=16    -  Ampicillin: R >16 These ampicillin results predict results for amoxicillin    -  Ampicillin/Sulbactam: S <=8/4    -  Aztreonam: S <=4    -  Cefazolin: S <=8 For uncomplicated UTI with K. pneumoniae, E. coli, or P. mirablis: TEENA <=16 is sensitive and TEENA >=32 is resistant. This also predicts results for oral agents cefaclor, cefdinir, cefpodoxime, cefprozil, cefuroxime axetil, cephalexin and locarbef for uncomplicated UTI. Note that some isolates may be susceptible to these agents while testing resistant to cefazolin.    -  Cefepime: S <=4    -  Cefoxitin: S <=8    -  Ceftriaxone: S <=1 Enterobacter, Citrobacter, and Serratia may develop resistance during prolonged therapy    -  Ciprofloxacin: R >2    -  Ertapenem: S <=1    -  Gentamicin: S <=4    -  Levofloxacin: I 4    -  Meropenem: S <=1    -  Nitrofurantoin: R >64 Should not be used to treat pyelonephritis    -  Piperacillin/Tazobactam: S <=16    -  Tobramycin: S <=4    -  Trimethoprim/Sulfamethoxazole: R >    Specimen Source: .Urine None    Culture Results:   50,000 - 99,000 CFU/mL Proteus mirabilis    Organism Identification: Proteus mirabilis    Organism: Proteus mirabilis    Method Type: TEENA    Radiology: all available radiological tests reviewed    < from: CT Chest No Cont (19 @ 15:38) >  Bilateral lower lobe pneumonia and atelectasis left worse than right.   Small left pleural effusion.    < end of copied text >        Advanced directives addressed: full resuscitation

## 2019-03-19 NOTE — PROGRESS NOTE ADULT - ASSESSMENT
1. Abnormal EKG- there are deep t wave inversions noted in the precordial leads which many times could be related to CNS pathology.   No evidence of ischemia - so Cardiac enzymes negative. No intracranial pathology per CT head.  Close monitoring of the electrolytes.   Close monitoring for now. Can start low dose asa/statin.    2. Pneumonia- Management per primary team.    3. Prolonged QT interval- avoid agents that can prolong the QT interval.  Mirtazepine can cause prolonged QT interval as <1% adverse effect.  Maintain K of 4 and mag of 2.    4. Continue conservative medical therapy. Pt has no active CP or signs of decompensation at present.   BP/HR well controlled.

## 2019-03-19 NOTE — PROGRESS NOTE ADULT - SUBJECTIVE AND OBJECTIVE BOX
cc: cough, weakness  86 y/o female with h/o dementia, chronic back pain, left foot drop presents from [facility] due to weakness and sob.  associated with fever, 101F, decreased po intake.  much of history provided by daughter and chart due to dementia.  states patient hasnt been progressing well with PT after hip replacement.   is also admitted in [facility] for similar symptoms. In ED, noted to have b/l pna. s/p vanco/cefepime.    3/14: Pt lying in bed, pleasantly confused and offers no complaints. Spoke to daughter at bedside and discussed diagnosis and plan with her.  3/15: Lying in bed, comfortable.  No fever, chills, n, v.  03/16/19: Patient seen and examined. Feels weak and tired. Discussed with daughter at bed side regarding management plan.   03/17/19: No new complaints.   03/18/19: Patient seen and examined. No active issues.   03/19/19: Patient seen and examined. Looks weka and tired.     REVIEW OF SYSTEMS: All other review of systems is negative unless indicated above.      Vital Signs Last 24 Hrs  T(C): 36.8 (19 Mar 2019 11:02), Max: 38.4 (18 Mar 2019 17:17)  T(F): 98.3 (19 Mar 2019 11:02), Max: 101.1 (18 Mar 2019 17:17)  HR: 92 (19 Mar 2019 12:07) (54 - 108)  BP: 111/68 (19 Mar 2019 11:02) (99/49 - 136/50)  BP(mean): --  RR: 18 (19 Mar 2019 11:02) (18 - 18)  SpO2: 99% (19 Mar 2019 11:02) (93% - 99%)        PHYSICAL EXAM:    Constitutional: NAD, awake and alert, frail, cachectic,   HEENT: PERR, EOMI, Normal Hearing, MMM, temporal wasting   Neck: Soft and supple  Respiratory: Breath sounds are clear bilaterally, No wheezing, rales or rhonchi  Cardiovascular: S1 and S2, regular rate and rhythm, no Murmurs, gallops or rubs  Gastrointestinal: Bowel Sounds present, soft, nontender, nondistended, no guarding, no rebound  Extremities: No peripheral edema  Neurological: A/O x 3, no focal deficits in my limited exam  Skin: No rashes          Labs:                                      11.3   12.89 )-----------( 304      ( 18 Mar 2019 07:27 )             36.7                           MEDICATIONS:          MEDICATIONS  (STANDING):  ALBUTerol    90 MICROgram(s) HFA Inhaler 1 Puff(s) Inhalation every 4 hours  ALBUTerol/ipratropium for Nebulization 3 milliLiter(s) Nebulizer every 6 hours  azithromycin   Tablet 500 milliGRAM(s) Oral daily  cefepime  Injectable. 1000 milliGRAM(s) IV Push every 12 hours  heparin  Injectable 5000 Unit(s) SubCutaneous every 12 hours  senna 2 Tablet(s) Oral at bedtime  tiotropium 18 MICROgram(s) Capsule 1 Capsule(s) Inhalation daily    MEDICATIONS  (PRN):  acetaminophen   Tablet .. 500 milliGRAM(s) Oral every 8 hours PRN Temp greater or equal to 38C (100.4F), Mild Pain (1 - 3)          Assessment and Plan:  86 y/o female    sob/cough: sepsis secondary to acute bacterial pneumonia and proteus UTI  -cefepime  per ID for probable gram neg bacteria and atypical, S/P zithro for  5days  -duoneb q6h and albuterol prn   -RVP neg  -supportive care  -f/u cultures: BC no growth    t wave inversions on ecg, d/w cardiology-no plan for cath at this time,   -holding asa/plavix, statin, bb  -monitor    Left Hip Hemiarthroplasty: stable    advanced Dementia, malnutrition hypoalbuminemia:  -mentation at baseline   -supportive care  -nutrition consult apprecaited    dvt prophylaxis     Dispo: Continue IV cefepime

## 2019-03-20 PROCEDURE — 71045 X-RAY EXAM CHEST 1 VIEW: CPT | Mod: 26

## 2019-03-20 RX ADMIN — Medication 3 MILLILITER(S): at 11:04

## 2019-03-20 RX ADMIN — CEFEPIME 1000 MILLIGRAM(S): 1 INJECTION, POWDER, FOR SOLUTION INTRAMUSCULAR; INTRAVENOUS at 04:55

## 2019-03-20 RX ADMIN — HEPARIN SODIUM 5000 UNIT(S): 5000 INJECTION INTRAVENOUS; SUBCUTANEOUS at 17:33

## 2019-03-20 RX ADMIN — HEPARIN SODIUM 5000 UNIT(S): 5000 INJECTION INTRAVENOUS; SUBCUTANEOUS at 04:55

## 2019-03-20 RX ADMIN — SENNA PLUS 2 TABLET(S): 8.6 TABLET ORAL at 21:50

## 2019-03-20 RX ADMIN — CEFEPIME 1000 MILLIGRAM(S): 1 INJECTION, POWDER, FOR SOLUTION INTRAMUSCULAR; INTRAVENOUS at 17:33

## 2019-03-20 NOTE — PROGRESS NOTE ADULT - ASSESSMENT
88 y/o female with h/o dementia, chronic back pain, left foot drop, left hip surgery, laminectomy was admitted on 3/13 from [facility] for increased weakness, SOB, fever to 101F, decreased PO intake. The patient had a recent left hip replacement undergoing PT therapy. In ER, she received cefepime and vancomycin IV.    1. Febrile syndrome resolving. B/l Lower lobes pneumonia. Probable aspiration pneumonia. Pyuria. UTI with PRMI. Encephalopathy.  -concerned about aspiration   -mild leukocytosis  -f/u BC x 2, urine c/s  -on cefepime 1 gm IV q12h # 7  -tolerating abx well so far; no side effects noted  -respiratory care  -repeat CXR  -aspiration precautions  -continue IV abx coverage for now  -monitor temps  -f/u CBC  -supportive care  2. Other issues:   -care per medicine

## 2019-03-20 NOTE — PROGRESS NOTE ADULT - SUBJECTIVE AND OBJECTIVE BOX
cc: cough, weakness  86 y/o female with h/o dementia, chronic back pain, left foot drop presents from [facility] due to weakness and sob.  associated with fever, 101F, decreased po intake.  much of history provided by daughter and chart due to dementia.  states patient hasnt been progressing well with PT after hip replacement.   is also admitted in [facility] for similar symptoms. In ED, noted to have b/l pna. s/p vanco/cefepime.    3/14: Pt lying in bed, pleasantly confused and offers no complaints. Spoke to daughter at bedside and discussed diagnosis and plan with her.  3/15: Lying in bed, comfortable.  No fever, chills, n, v.  03/16/19: Patient seen and examined. Feels weak and tired. Discussed with daughter at bed side regarding management plan.   03/17/19: No new complaints.   03/18/19: Patient seen and examined. No active issues.   03/19/19: Patient seen and examined. Looks weka and tired.   03/20/19: looks tired    REVIEW OF SYSTEMS: All other review of systems is negative unless indicated above.      Vital Signs Last 24 Hrs  T(C): 36.8 (20 Mar 2019 11:36), Max: 37.1 (19 Mar 2019 17:21)  T(F): 98.3 (20 Mar 2019 11:36), Max: 98.8 (19 Mar 2019 17:21)  HR: 105 (20 Mar 2019 11:36) (93 - 105)  BP: 130/54 (20 Mar 2019 11:36) (118/61 - 142/67)  BP(mean): --  RR: 19 (20 Mar 2019 11:36) (18 - 19)  SpO2: 100% (20 Mar 2019 11:36) (96% - 100%)        PHYSICAL EXAM:    Constitutional: NAD, awake and alert, frail, cachectic,   HEENT: PERR, EOMI, Normal Hearing, MMM, temporal wasting   Neck: Soft and supple  Respiratory: Breath sounds are clear bilaterally, No wheezing, rales or rhonchi  Cardiovascular: S1 and S2, regular rate and rhythm, no Murmurs, gallops or rubs  Gastrointestinal: Bowel Sounds present, soft, nontender, nondistended, no guarding, no rebound  Extremities: No peripheral edema  Neurological: A/O x 3, no focal deficits in my limited exam  Skin: No rashes          Labs:              No labs today                            MEDICATIONS:    MEDICATIONS  (STANDING):  ALBUTerol    90 MICROgram(s) HFA Inhaler 1 Puff(s) Inhalation every 4 hours  ALBUTerol/ipratropium for Nebulization 3 milliLiter(s) Nebulizer every 6 hours  cefepime  Injectable. 1000 milliGRAM(s) IV Push every 12 hours  heparin  Injectable 5000 Unit(s) SubCutaneous every 12 hours  senna 2 Tablet(s) Oral at bedtime  tiotropium 18 MICROgram(s) Capsule 1 Capsule(s) Inhalation daily    MEDICATIONS  (PRN):  acetaminophen   Tablet .. 500 milliGRAM(s) Oral every 8 hours PRN Temp greater or equal to 38C (100.4F), Mild Pain (1 - 3)        Assessment and Plan:  86 y/o female    sob/cough: sepsis secondary to acute bacterial pneumonia and proteus UTI  -cefepime  per ID for probable gram neg bacteria and atypical, S/P zithro for  5days  -duoneb q6h and albuterol prn   -RVP neg  -supportive care  -f/u cultures: BC no growth    t wave inversions on ecg, d/w cardiology-no plan for cath at this time,   -holding asa/plavix, statin, bb  -monitor    Left Hip Hemiarthroplasty: stable    advanced Dementia, malnutrition hypoalbuminemia:  -mentation at baseline   -supportive care  -nutrition consult apprecaited    dvt prophylaxis     Dispo: Continue IV cefepime

## 2019-03-20 NOTE — PROGRESS NOTE ADULT - SUBJECTIVE AND OBJECTIVE BOX
Date of service: 19 @ 12:30    Lying in bed in NAD  Alert and confused  Weak looking    ROS limited: no fever or chills; no HA, no dysuria, no legs pain, no rashes    MEDICATIONS  (STANDING):  ALBUTerol    90 MICROgram(s) HFA Inhaler 1 Puff(s) Inhalation every 4 hours  ALBUTerol/ipratropium for Nebulization 3 milliLiter(s) Nebulizer every 6 hours  cefepime  Injectable. 1000 milliGRAM(s) IV Push every 12 hours  heparin  Injectable 5000 Unit(s) SubCutaneous every 12 hours  senna 2 Tablet(s) Oral at bedtime  tiotropium 18 MICROgram(s) Capsule 1 Capsule(s) Inhalation daily      Vital Signs Last 24 Hrs  T(C): 36.8 (20 Mar 2019 11:36), Max: 37.1 (19 Mar 2019 17:21)  T(F): 98.3 (20 Mar 2019 11:36), Max: 98.8 (19 Mar 2019 17:21)  HR: 105 (20 Mar 2019 11:36) (93 - 105)  BP: 130/54 (20 Mar 2019 11:36) (118/61 - 142/67)  BP(mean): --  RR: 19 (20 Mar 2019 11:36) (18 - 19)  SpO2: 100% (20 Mar 2019 11:36) (96% - 100%)    Physical Exam:      Constitutional: frail looking  HEENT: NC/AT, EOMI, PERRLA, conjunctivae clear  Neck: supple; thyroid not palpable  Back: no tenderness  Respiratory: respiratory effort normal; crackles at bases  Cardiovascular: S1S2 regular, no murmurs  Abdomen: soft, not tender, not distended, positive BS  Genitourinary: no suprapubic tenderness  Lymphatic: no LN palpable  Musculoskeletal: no muscle tenderness, no joint swelling or tenderness  Extremities: no pedal edema  Neurological/ Psychiatric: confused, moving all extremities  Skin: no rashes; no palpable lesions    Labs: reviewed                        11.3   12.89 )-----------( 304      ( 18 Mar 2019 07:27 )             36.7                         11.3   13.28 )-----------( 295      ( 15 Mar 2019 06:58 )             35.7     03-15    139  |  105  |  18  ----------------------------<  108<H>  4.1   |  24  |  0.66    Ca    8.7      15 Mar 2019 06:58  Phos  3.0     03-14  Mg     1.8     03-14    TPro  6.4  /  Alb  2.1<L>  /  TBili  0.5  /  DBili  x   /  AST  20  /  ALT  57  /  AlkPhos  115  03-14                        10.4   11.38 )-----------( 246      ( 14 Mar 2019 07:39 )             32.4     03-14    137  |  104  |  22  ----------------------------<  129<H>  3.9   |  23  |  0.57    Ca    8.3<L>      14 Mar 2019 07:39  Phos  3.0     03-14  Mg     1.8     03-14    TPro  6.4  /  Alb  2.1<L>  /  TBili  0.5  /  DBili  x   /  AST  20  /  ALT  57  /  AlkPhos  115  03-14     LIVER FUNCTIONS - ( 14 Mar 2019 07:39 )  Alb: 2.1 g/dL / Pro: 6.4 gm/dL / ALK PHOS: 115 U/L / ALT: 57 U/L / AST: 20 U/L / GGT: x           Urinalysis Basic - ( 13 Mar 2019 23:09 )    Color: Yellow / Appearance: very cloudy / S.010 / pH: x  Gluc: x / Ketone: Negative  / Bili: Negative / Urobili: Negative mg/dL   Blood: x / Protein: 100 mg/dL / Nitrite: Positive   Leuk Esterase: Moderate / RBC: 0-2 /HPF / WBC >50   Sq Epi: x / Non Sq Epi: Negative / Bacteria: TNTC    Rapid RVP Result: NotDetec    Culture - Blood (collected 13 Mar 2019 17:54)  Source: .Blood None  Preliminary Report (15 Mar 2019 02:02):    No growth to date.    Culture - Blood (collected 13 Mar 2019 17:54)  Source: .Blood None  Preliminary Report (15 Mar 2019 02:02):    No growth to date.    Culture - Urine (19 @ 23:09)    -  Amikacin: S <=16    -  Ampicillin: R >16 These ampicillin results predict results for amoxicillin    -  Ampicillin/Sulbactam: S <=8/4    -  Aztreonam: S <=4    -  Cefazolin: S <=8 For uncomplicated UTI with K. pneumoniae, E. coli, or P. mirablis: TEENA <=16 is sensitive and TEENA >=32 is resistant. This also predicts results for oral agents cefaclor, cefdinir, cefpodoxime, cefprozil, cefuroxime axetil, cephalexin and locarbef for uncomplicated UTI. Note that some isolates may be susceptible to these agents while testing resistant to cefazolin.    -  Cefepime: S <=4    -  Cefoxitin: S <=8    -  Ceftriaxone: S <=1 Enterobacter, Citrobacter, and Serratia may develop resistance during prolonged therapy    -  Ciprofloxacin: R >2    -  Ertapenem: S <=1    -  Gentamicin: S <=4    -  Levofloxacin: I 4    -  Meropenem: S <=1    -  Nitrofurantoin: R >64 Should not be used to treat pyelonephritis    -  Piperacillin/Tazobactam: S <=16    -  Tobramycin: S <=4    -  Trimethoprim/Sulfamethoxazole: R >2/38    Specimen Source: .Urine None    Culture Results:   50,000 - 99,000 CFU/mL Proteus mirabilis    Organism Identification: Proteus mirabilis    Organism: Proteus mirabilis    Method Type: TEENA    Radiology: all available radiological tests reviewed    < from: CT Chest No Cont (19 @ 15:38) >  Bilateral lower lobe pneumonia and atelectasis left worse than right.   Small left pleural effusion.    < end of copied text >        Advanced directives addressed: full resuscitation

## 2019-03-21 LAB
ANION GAP SERPL CALC-SCNC: 6 MMOL/L — SIGNIFICANT CHANGE UP (ref 5–17)
BUN SERPL-MCNC: 39 MG/DL — HIGH (ref 7–23)
CALCIUM SERPL-MCNC: 9 MG/DL — SIGNIFICANT CHANGE UP (ref 8.5–10.1)
CHLORIDE SERPL-SCNC: 112 MMOL/L — HIGH (ref 96–108)
CO2 SERPL-SCNC: 28 MMOL/L — SIGNIFICANT CHANGE UP (ref 22–31)
CREAT SERPL-MCNC: 0.69 MG/DL — SIGNIFICANT CHANGE UP (ref 0.5–1.3)
GLUCOSE SERPL-MCNC: 113 MG/DL — HIGH (ref 70–99)
HCT VFR BLD CALC: 36.7 % — SIGNIFICANT CHANGE UP (ref 34.5–45)
HGB BLD-MCNC: 11.5 G/DL — SIGNIFICANT CHANGE UP (ref 11.5–15.5)
MCHC RBC-ENTMCNC: 28.3 PG — SIGNIFICANT CHANGE UP (ref 27–34)
MCHC RBC-ENTMCNC: 31.3 GM/DL — LOW (ref 32–36)
MCV RBC AUTO: 90.4 FL — SIGNIFICANT CHANGE UP (ref 80–100)
NRBC # BLD: 0 /100 WBCS — SIGNIFICANT CHANGE UP (ref 0–0)
PLATELET # BLD AUTO: 242 K/UL — SIGNIFICANT CHANGE UP (ref 150–400)
POTASSIUM SERPL-MCNC: 3.8 MMOL/L — SIGNIFICANT CHANGE UP (ref 3.5–5.3)
POTASSIUM SERPL-SCNC: 3.8 MMOL/L — SIGNIFICANT CHANGE UP (ref 3.5–5.3)
RBC # BLD: 4.06 M/UL — SIGNIFICANT CHANGE UP (ref 3.8–5.2)
RBC # FLD: 13.7 % — SIGNIFICANT CHANGE UP (ref 10.3–14.5)
SODIUM SERPL-SCNC: 146 MMOL/L — HIGH (ref 135–145)
WBC # BLD: 9.23 K/UL — SIGNIFICANT CHANGE UP (ref 3.8–10.5)
WBC # FLD AUTO: 9.23 K/UL — SIGNIFICANT CHANGE UP (ref 3.8–10.5)

## 2019-03-21 RX ADMIN — CEFEPIME 1000 MILLIGRAM(S): 1 INJECTION, POWDER, FOR SOLUTION INTRAMUSCULAR; INTRAVENOUS at 05:00

## 2019-03-21 RX ADMIN — Medication 3 MILLILITER(S): at 08:10

## 2019-03-21 RX ADMIN — Medication 3 MILLILITER(S): at 14:00

## 2019-03-21 RX ADMIN — HEPARIN SODIUM 5000 UNIT(S): 5000 INJECTION INTRAVENOUS; SUBCUTANEOUS at 05:00

## 2019-03-21 RX ADMIN — HEPARIN SODIUM 5000 UNIT(S): 5000 INJECTION INTRAVENOUS; SUBCUTANEOUS at 17:34

## 2019-03-21 RX ADMIN — Medication 3 MILLILITER(S): at 20:07

## 2019-03-21 RX ADMIN — SENNA PLUS 2 TABLET(S): 8.6 TABLET ORAL at 21:35

## 2019-03-21 NOTE — PROGRESS NOTE ADULT - ASSESSMENT
1. Abnormal EKG- there are deep t wave inversions noted in the precordial leads which many times could be related to CNS pathology.   No evidence of ischemia - so Cardiac enzymes negative. No intracranial pathology per CT head.  Close monitoring of the electrolytes.   Close monitoring for now. Can start low dose asa/statin.    2. Pneumonia- Management per primary team.    3. Prolonged QT interval- avoid agents that can prolong the QT interval.  Mirtazepine can cause prolonged QT interval as <1% adverse effect.  Maintain K of 4 and mag of 2.    4. Continue conservative medical therapy. Pt has no active CP or signs of decompensation at present.   BP/HR well controlled.   D/W daughter Nereyad

## 2019-03-21 NOTE — PROGRESS NOTE ADULT - SUBJECTIVE AND OBJECTIVE BOX
cc: cough, weakness  88 y/o female with h/o dementia, chronic back pain, left foot drop presents from [facility] due to weakness and sob.  associated with fever, 101F, decreased po intake.  much of history provided by daughter and chart due to dementia.  states patient hasnt been progressing well with PT after hip replacement.   is also admitted in [facility] for similar symptoms. In ED, noted to have b/l pna. s/p vanco/cefepime.    3/14: Pt lying in bed, pleasantly confused and offers no complaints. Spoke to daughter at bedside and discussed diagnosis and plan with her.  3/15: Lying in bed, comfortable.  No fever, chills, n, v.  03/16/19: Patient seen and examined. Feels weak and tired. Discussed with daughter at bed side regarding management plan.   03/17/19: No new complaints.   03/18/19: Patient seen and examined. No active issues.   03/19/19: Patient seen and examined. Looks weka and tired.   03/20/19: looks tired  03/21/19: Patient seen and examined. Looks more awake and alert, confused.     REVIEW OF SYSTEMS: All other review of systems is negative unless indicated above.      Vital Signs Last 24 Hrs  T(C): 36.7 (21 Mar 2019 11:24), Max: 37.3 (20 Mar 2019 17:15)  T(F): 98.1 (21 Mar 2019 11:24), Max: 99.1 (20 Mar 2019 17:15)  HR: 97 (21 Mar 2019 11:24) (91 - 97)  BP: 114/62 (21 Mar 2019 11:24) (113/58 - 117/51)  BP(mean): --  RR: 16 (21 Mar 2019 11:24) (16 - 18)  SpO2: 97% (21 Mar 2019 11:24) (97% - 98%)        PHYSICAL EXAM:    Constitutional: NAD, awake and alert, frail, cachectic,   HEENT: PERR, EOMI, Normal Hearing, MMM, temporal wasting   Neck: Soft and supple  Respiratory: Breath sounds are clear bilaterally, No wheezing, rales or rhonchi  Cardiovascular: S1 and S2, regular rate and rhythm, no Murmurs, gallops or rubs  Gastrointestinal: Bowel Sounds present, soft, nontender, nondistended, no guarding, no rebound  Extremities: No peripheral edema  Neurological: A/O x 3, no focal deficits in my limited exam  Skin: No rashes          Labs:                                    11.5   9.23  )-----------( 242      ( 21 Mar 2019 06:59 )             36.7     21 Mar 2019 06:59    146    |  112    |  39     ----------------------------<  113    3.8     |  28     |  0.69     Ca    9.0        21 Mar 2019 06:59                        MEDICATIONS:    MEDICATIONS  (STANDING):  ALBUTerol    90 MICROgram(s) HFA Inhaler 1 Puff(s) Inhalation every 4 hours  ALBUTerol/ipratropium for Nebulization 3 milliLiter(s) Nebulizer every 6 hours  cefepime  Injectable. 1000 milliGRAM(s) IV Push every 12 hours  heparin  Injectable 5000 Unit(s) SubCutaneous every 12 hours  senna 2 Tablet(s) Oral at bedtime  tiotropium 18 MICROgram(s) Capsule 1 Capsule(s) Inhalation daily    MEDICATIONS  (PRN):  acetaminophen   Tablet .. 500 milliGRAM(s) Oral every 8 hours PRN Temp greater or equal to 38C (100.4F), Mild Pain (1 - 3)        Assessment and Plan:  88 y/o female    sob/cough: sepsis secondary to acute bacterial pneumonia and proteus UTI  -cefepime  per ID for probable gram neg bacteria and atypical, S/P zithro for  5days  -duoneb q6h and albuterol prn   -RVP neg  -supportive care  -f/u cultures: BC no growth    t wave inversions on ecg, d/w cardiology-no plan for cath at this time,   -holding asa/plavix, statin, bb  -monitor    Left Hip Hemiarthroplasty: stable    advanced Dementia, malnutrition hypoalbuminemia:  -mentation at baseline   -supportive care  -nutrition consult appreciated     dvt prophylaxis     Dispo: Continue IV cefepime

## 2019-03-21 NOTE — PROGRESS NOTE ADULT - ASSESSMENT
88 y/o female with h/o dementia, chronic back pain, left foot drop, left hip surgery, laminectomy was admitted on 3/13 from [facility] for increased weakness, SOB, fever to 101F, decreased PO intake. The patient had a recent left hip replacement undergoing PT therapy. In ER, she received cefepime and vancomycin IV.    1. B/l Lower lobes pneumonia. Probable aspiration pneumonia resolving. Pyuria. UTI with PRMI. Encephalopathy.  -concerned about aspiration   -mild leukocytosis  -f/u BC x 2, urine c/s  -on cefepime 1 gm IV q12h # 8  -tolerating abx well so far; no side effects noted  -respiratory care  -CXR reviewed  -aspiration precautions  -continue IV abx coverage for now  -monitor temps  -f/u CBC  -supportive care  2. Other issues:   -care per medicine 86 y/o female with h/o dementia, chronic back pain, left foot drop, left hip surgery, laminectomy was admitted on 3/13 from [facility] for increased weakness, SOB, fever to 101F, decreased PO intake. The patient had a recent left hip replacement undergoing PT therapy. In ER, she received cefepime and vancomycin IV.    1. B/l Lower lobes pneumonia. Probable aspiration pneumonia resolving. Pyuria. UTI with PRMI. Encephalopathy.  -concerned about aspiration   -mild leukocytosis  -f/u BC x 2, urine c/s  -on cefepime 1 gm IV q12h # 8  -tolerating abx well so far; no side effects noted  -respiratory care  -CXR reviewed  -aspiration precautions  -complete abx therapy  -monitor temps  -f/u CBC  -supportive care  2. Other issues:   -care per medicine

## 2019-03-21 NOTE — CHART NOTE - NSCHARTNOTEFT_GEN_A_CORE
Assessment:   Pt seen for follow up. discussed pt's intake with Nursing assistance. Pt PO intake remains low and pt is only eating a few bites at meals. p    Recommendations:        Diet Presciption: Diet, Mechanical Soft (03-14-19 @ 00:52)      Wt Hx:    Weight (kg): 40.8 (03-13-19 @ 13:10)        Estimated Needs:   [ ] no change since previous assessment        Nutrition Diagnosis is [ ] ongoing  [ ] resolved [ ] not applicable         New Nutrition Diagnosis: [ ] not applicable         Pertinent Medications: MEDICATIONS  (STANDING):  ALBUTerol    90 MICROgram(s) HFA Inhaler 1 Puff(s) Inhalation every 4 hours  ALBUTerol/ipratropium for Nebulization 3 milliLiter(s) Nebulizer every 6 hours  cefepime  Injectable. 1000 milliGRAM(s) IV Push every 12 hours  heparin  Injectable 5000 Unit(s) SubCutaneous every 12 hours  senna 2 Tablet(s) Oral at bedtime  tiotropium 18 MICROgram(s) Capsule 1 Capsule(s) Inhalation daily    MEDICATIONS  (PRN):  acetaminophen   Tablet .. 500 milliGRAM(s) Oral every 8 hours PRN Temp greater or equal to 38C (100.4F), Mild Pain (1 - 3)    Pertinent Labs: 03-21 Na146 mmol/L<H> Glu 113 mg/dL<H> K+ 3.8 mmol/L Cr  0.69 mg/dL BUN 39 mg/dL<H>     CAPILLARY BLOOD GLUCOSE          Skin: jean paul score =           Monitoring and Evaluation:   [x] PO intake/Nutr support infusion [ x ] Tolerance to Nutr [ x ] weights [ x ] labs[ x ] follow up per protocol  [ ] other: Assessment:   Pt seen for follow up. discussed pt's intake with Nursing assistance. Pt PO intake remains low and pt is only eating a few bites at meals. Pt is not eating much the past few days. Pt will order food and not be interested in it. Pt also is not completing supplement and is only taking a few sips. Pt overall nutrition status remains poor. Pt screened for severe protein-calorie malnutrition on admission per initial assessment.  Pt jean paul 13, no skin breakdown noted, no edema. Pt intake may be low but pt otherwise is tolerating mechanical soft diet. Pt's Na elevated 2/2 dehydration?. Will continue to monitor pt's nutritional status.     Recommendations:  C/w current nutrition care plan  Encourage PO intake and fluid intake   Encourage supplement  Monitor Na      Diet Presciption: Diet, Mechanical Soft (03-14-19 @ 00:52)        Weight (kg): 40.8 (03-13-19 @ 13:10)        Estimated Needs:   [ ] no change since previous assessment  Calories: 1275kcal-1500kcal  Protein: 60g-68g  Fluid: 1275ml-1500ml      Nutrition Diagnosis is [x] ongoing  [ ] resolved [ ] not applicable   · Nutrition Diagnostic Terminology #1: Nutrient  · Nutrient: Malnutrition; pt meets criteria for severe malnutrition in the context of chronic illness  · Etiology: inadequate PO intake 2/2 dementia, adv age  · Signs/Symptoms: severe muscle wasting, severe fat depletion  · Nutrition Intervention: Meals and Snack; Medical Food Supplements; Vitamin; Feeding Assistance  · Meals and Snacks: General/healthful diet; [facility] soft diet  · Medical and Food Supplements: Commercial beverage; ensure enlive TID  · Vitamin: Multivitamin/mineral  · Feeding Assistance: Feeding Assistance; Meal set -up; Menu selection assistance  · Goal/Expected Outcome: Pt will consume at least 80% of meals/supps, wt gain towards IBW, no signs muscle/fat wasting.        New Nutrition Diagnosis: [ ] not applicable         Pertinent Medications: MEDICATIONS  (STANDING):  ALBUTerol    90 MICROgram(s) HFA Inhaler 1 Puff(s) Inhalation every 4 hours  ALBUTerol/ipratropium for Nebulization 3 milliLiter(s) Nebulizer every 6 hours  cefepime  Injectable. 1000 milliGRAM(s) IV Push every 12 hours  heparin  Injectable 5000 Unit(s) SubCutaneous every 12 hours  senna 2 Tablet(s) Oral at bedtime  tiotropium 18 MICROgram(s) Capsule 1 Capsule(s) Inhalation daily    MEDICATIONS  (PRN):  acetaminophen   Tablet .. 500 milliGRAM(s) Oral every 8 hours PRN Temp greater or equal to 38C (100.4F), Mild Pain (1 - 3)    Pertinent Labs: 03-21 Na146 mmol/L<H> Glu 113 mg/dL<H> K+ 3.8 mmol/L Cr  0.69 mg/dL BUN 39 mg/dL<H>     CAPILLARY BLOOD GLUCOSE          Skin: jean paul score =           Monitoring and Evaluation:   [x] PO intake/Nutr support infusion [ x ] Tolerance to Nutr [ x ] weights [ x ] labs[ x ] follow up per protocol  [ ] other:

## 2019-03-21 NOTE — PROGRESS NOTE ADULT - SUBJECTIVE AND OBJECTIVE BOX
Patient is a 87y old  Female who presents with a chief complaint of cough, weakness (20 Mar 2019 15:17)  3/21- denies CP or SOB       MEDICATIONS  (STANDING):  ALBUTerol    90 MICROgram(s) HFA Inhaler 1 Puff(s) Inhalation every 4 hours  ALBUTerol/ipratropium for Nebulization 3 milliLiter(s) Nebulizer every 6 hours  cefepime  Injectable. 1000 milliGRAM(s) IV Push every 12 hours  heparin  Injectable 5000 Unit(s) SubCutaneous every 12 hours  senna 2 Tablet(s) Oral at bedtime  tiotropium 18 MICROgram(s) Capsule 1 Capsule(s) Inhalation daily    MEDICATIONS  (PRN):  acetaminophen   Tablet .. 500 milliGRAM(s) Oral every 8 hours PRN Temp greater or equal to 38C (100.4F), Mild Pain (1 - 3)            Vital Signs Last 24 Hrs  T(C): 37 (20 Mar 2019 21:57), Max: 37.3 (20 Mar 2019 17:15)  T(F): 98.6 (20 Mar 2019 21:57), Max: 99.1 (20 Mar 2019 17:15)  HR: 91 (20 Mar 2019 21:57) (91 - 105)  BP: 113/58 (20 Mar 2019 21:57) (113/58 - 130/54)  BP(mean): --  RR: 18 (20 Mar 2019 21:57) (18 - 19)  SpO2: 98% (20 Mar 2019 21:57) (97% - 100%)            INTERPRETATION OF TELEMETRY:    ECG:        LABS:                        11.5   9.23  )-----------( 242      ( 21 Mar 2019 06:59 )             36.7     03-21    146<H>  |  112<H>  |  39<H>  ----------------------------<  113<H>  3.8   |  28  |  0.69    Ca    9.0      21 Mar 2019 06:59              I&O's Summary    BNP  RADIOLOGY & ADDITIONAL STUDIES:

## 2019-03-21 NOTE — PROGRESS NOTE ADULT - SUBJECTIVE AND OBJECTIVE BOX
Date of service: 19 @ 12:30    Lying in bed in NAD  Alert and confused  Weak looking    ROS limited: no fever or chills; no HA, no dysuria, no legs pain, no rashes    MEDICATIONS  (STANDING):  ALBUTerol    90 MICROgram(s) HFA Inhaler 1 Puff(s) Inhalation every 4 hours  ALBUTerol/ipratropium for Nebulization 3 milliLiter(s) Nebulizer every 6 hours  cefepime  Injectable. 1000 milliGRAM(s) IV Push every 12 hours  heparin  Injectable 5000 Unit(s) SubCutaneous every 12 hours  senna 2 Tablet(s) Oral at bedtime  tiotropium 18 MICROgram(s) Capsule 1 Capsule(s) Inhalation daily      Vital Signs Last 24 Hrs  T(C): 36.8 (20 Mar 2019 11:36), Max: 37.1 (19 Mar 2019 17:21)  T(F): 98.3 (20 Mar 2019 11:36), Max: 98.8 (19 Mar 2019 17:21)  HR: 105 (20 Mar 2019 11:36) (93 - 105)  BP: 130/54 (20 Mar 2019 11:36) (118/61 - 142/67)  BP(mean): --  RR: 19 (20 Mar 2019 11:36) (18 - 19)  SpO2: 100% (20 Mar 2019 11:36) (96% - 100%)    Physical Exam:      Constitutional: frail looking  HEENT: NC/AT, EOMI, PERRLA, conjunctivae clear  Neck: supple; thyroid not palpable  Back: no tenderness  Respiratory: respiratory effort normal; crackles at bases  Cardiovascular: S1S2 regular, no murmurs  Abdomen: soft, not tender, not distended, positive BS  Genitourinary: no suprapubic tenderness  Lymphatic: no LN palpable  Musculoskeletal: no muscle tenderness, no joint swelling or tenderness  Extremities: no pedal edema  Neurological/ Psychiatric: confused, moving all extremities  Skin: no rashes; no palpable lesions    Labs: reviewed                        11.3   12.89 )-----------( 304      ( 18 Mar 2019 07:27 )             36.7                         11.3   13.28 )-----------( 295      ( 15 Mar 2019 06:58 )             35.7     03-15    139  |  105  |  18  ----------------------------<  108<H>  4.1   |  24  |  0.66    Ca    8.7      15 Mar 2019 06:58  Phos  3.0     03-14  Mg     1.8     03-14    TPro  6.4  /  Alb  2.1<L>  /  TBili  0.5  /  DBili  x   /  AST  20  /  ALT  57  /  AlkPhos  115  03-14                        10.4   11.38 )-----------( 246      ( 14 Mar 2019 07:39 )             32.4     03-14    137  |  104  |  22  ----------------------------<  129<H>  3.9   |  23  |  0.57    Ca    8.3<L>      14 Mar 2019 07:39  Phos  3.0     03-14  Mg     1.8     03-14    TPro  6.4  /  Alb  2.1<L>  /  TBili  0.5  /  DBili  x   /  AST  20  /  ALT  57  /  AlkPhos  115  03-14     LIVER FUNCTIONS - ( 14 Mar 2019 07:39 )  Alb: 2.1 g/dL / Pro: 6.4 gm/dL / ALK PHOS: 115 U/L / ALT: 57 U/L / AST: 20 U/L / GGT: x           Urinalysis Basic - ( 13 Mar 2019 23:09 )    Color: Yellow / Appearance: very cloudy / S.010 / pH: x  Gluc: x / Ketone: Negative  / Bili: Negative / Urobili: Negative mg/dL   Blood: x / Protein: 100 mg/dL / Nitrite: Positive   Leuk Esterase: Moderate / RBC: 0-2 /HPF / WBC >50   Sq Epi: x / Non Sq Epi: Negative / Bacteria: TNTC    Rapid RVP Result: NotDetec    Culture - Blood (collected 13 Mar 2019 17:54)  Source: .Blood None  Preliminary Report (15 Mar 2019 02:02):    No growth to date.    Culture - Blood (collected 13 Mar 2019 17:54)  Source: .Blood None  Preliminary Report (15 Mar 2019 02:02):    No growth to date.    Culture - Urine (19 @ 23:09)    -  Amikacin: S <=16    -  Ampicillin: R >16 These ampicillin results predict results for amoxicillin    -  Ampicillin/Sulbactam: S <=8/4    -  Aztreonam: S <=4    -  Cefazolin: S <=8 For uncomplicated UTI with K. pneumoniae, E. coli, or P. mirablis: TEENA <=16 is sensitive and TEENA >=32 is resistant. This also predicts results for oral agents cefaclor, cefdinir, cefpodoxime, cefprozil, cefuroxime axetil, cephalexin and locarbef for uncomplicated UTI. Note that some isolates may be susceptible to these agents while testing resistant to cefazolin.    -  Cefepime: S <=4    -  Cefoxitin: S <=8    -  Ceftriaxone: S <=1 Enterobacter, Citrobacter, and Serratia may develop resistance during prolonged therapy    -  Ciprofloxacin: R >2    -  Ertapenem: S <=1    -  Gentamicin: S <=4    -  Levofloxacin: I 4    -  Meropenem: S <=1    -  Nitrofurantoin: R >64 Should not be used to treat pyelonephritis    -  Piperacillin/Tazobactam: S <=16    -  Tobramycin: S <=4    -  Trimethoprim/Sulfamethoxazole: R >2/38    Specimen Source: .Urine None    Culture Results:   50,000 - 99,000 CFU/mL Proteus mirabilis    Organism Identification: Proteus mirabilis    Organism: Proteus mirabilis    Method Type: TEENA    Radiology: all available radiological tests reviewed    < from: CT Chest No Cont (19 @ 15:38) >  Bilateral lower lobe pneumonia and atelectasis left worse than right.   Small left pleural effusion.    < end of copied text >    < from: Xray Chest 1 View- PORTABLE-Routine (19 @ 09:36) >  No acute consolidation, sizable effusion or pneumothorax. Chronic   interstitial infiltrates. Cardiomediastinal silhouette is stable.   Osteopenia and degenerative changes.    If symptoms persist, and additional imaging evaluation is needed,   follow-up CT chest is suggested.    < end of copied text >      Advanced directives addressed: full resuscitation

## 2019-03-22 ENCOUNTER — TRANSCRIPTION ENCOUNTER (OUTPATIENT)
Age: 84
End: 2019-03-22

## 2019-03-22 VITALS
OXYGEN SATURATION: 96 % | RESPIRATION RATE: 17 BRPM | TEMPERATURE: 98 F | HEART RATE: 105 BPM | DIASTOLIC BLOOD PRESSURE: 55 MMHG | SYSTOLIC BLOOD PRESSURE: 116 MMHG

## 2019-03-22 RX ORDER — MIRTAZAPINE 45 MG/1
0.5 TABLET, ORALLY DISINTEGRATING ORAL
Qty: 0 | Refills: 0 | COMMUNITY

## 2019-03-22 RX ORDER — TIOTROPIUM BROMIDE 18 UG/1
1 CAPSULE ORAL; RESPIRATORY (INHALATION)
Qty: 0 | Refills: 0 | COMMUNITY
Start: 2019-03-22

## 2019-03-22 RX ORDER — IPRATROPIUM/ALBUTEROL SULFATE 18-103MCG
3 AEROSOL WITH ADAPTER (GRAM) INHALATION
Qty: 0 | Refills: 0 | COMMUNITY
Start: 2019-03-22

## 2019-03-22 RX ADMIN — HEPARIN SODIUM 5000 UNIT(S): 5000 INJECTION INTRAVENOUS; SUBCUTANEOUS at 05:36

## 2019-03-22 RX ADMIN — Medication 3 MILLILITER(S): at 13:50

## 2019-03-22 RX ADMIN — Medication 3 MILLILITER(S): at 07:55

## 2019-03-22 NOTE — PROGRESS NOTE ADULT - PROVIDER SPECIALTY LIST ADULT
Cardiology
Hospitalist
Infectious Disease
Cardiology

## 2019-03-22 NOTE — DISCHARGE NOTE PROVIDER - NSDCCPCAREPLAN_GEN_ALL_CORE_FT
PRINCIPAL DISCHARGE DIAGNOSIS  Diagnosis: Pneumonia of both lower lobes due to infectious organism  Assessment and Plan of Treatment: completed IV cefepime. Aspiration precautions

## 2019-03-22 NOTE — DISCHARGE NOTE NURSING/CASE MANAGEMENT/SOCIAL WORK - NSDCDPATPORTLINK_GEN_ALL_CORE
You can access the [facility], offered by [facility], by registering with the following website: [email or web address]

## 2019-03-22 NOTE — DISCHARGE NOTE PROVIDER - CARE PROVIDER_API CALL
Fernie Souza (DO)  Orthopaedic Surgery  [address] [place] [address]  Phone: [phone]  Fax: [phone]  Follow Up Time:

## 2019-03-22 NOTE — PROGRESS NOTE ADULT - SUBJECTIVE AND OBJECTIVE BOX
Cardiology Progress Note  Patient is a 87y old  Female who presents with a chief complaint of cough, weakness.     HPI: 86 y/o female with h/o dementia, chronic back pain, left foot drop presents from [facility] due to weakness and sob.  Associated with fever, 101F, decreased po intake.  Much of history provided by daughter at presentation to the admitting physician and chart due to dementia.  States patient hasn't been progressing well with PT after hip replacement.    Pt seen and examined by me this am. She was unable to provide any answered to any questions. Appears to be comfortable and was mumbling.   No overnight events per staff.   In ED, noted to have b/l pna. s/p vanco/cefepime.    3/15-p t seen and examined this am. No overnight issues per staff.  No distress. pt not verbal and she is able to mumble. Confused.     3/18. Chart reviewed. No events last pm. Pt is nonverbal, confused.   No CP/SOB. Lying flat in bed.     3/19. No events last pm. Confused. Not on tele. No CP.    3/22. No events last pm. Receiving breathing tx this AM.   No CP.     PAST MEDICAL HISTORY:  dementia, right foot drop  PAST SURGICAL HISTORY: laminectomyx2, left hip surgery  FAMILY HISTORY:   denies       PAST MEDICAL & SURGICAL HISTORY:  Dementia  History of lumbar laminectomy      MEDICATIONS  (STANDING):  ALBUTerol    90 MICROgram(s) HFA Inhaler 1 Puff(s) Inhalation every 4 hours  ALBUTerol/ipratropium for Nebulization 3 milliLiter(s) Nebulizer every 6 hours  azithromycin   Tablet 500 milliGRAM(s) Oral daily  cefepime  Injectable. 1000 milliGRAM(s) IV Push every 12 hours  mirtazapine 7.5 milliGRAM(s) Oral at bedtime  senna 2 Tablet(s) Oral at bedtime  tiotropium 18 MICROgram(s) Capsule 1 Capsule(s) Inhalation daily    MEDICATIONS  (PRN):  acetaminophen   Tablet .. 500 milliGRAM(s) Oral every 8 hours PRN Temp greater or equal to 38C (100.4F), Mild Pain (1 - 3)      FAMILY HISTORY:  Family history of cardiac disorder (Father)      SOCIAL HISTORY:  unable to obtain per pt.     REVIEW OF SYSTEMS:  per HPI.  Pt unable to provide any at this time        Vital Signs Last 24 Hrs  T(C): 37.2 (14 Mar 2019 11:45), Max: 37.9 (13 Mar 2019 21:41)  T(F): 98.9 (14 Mar 2019 11:45), Max: 100.2 (13 Mar 2019 21:41)  HR: 80 (14 Mar 2019 13:46) (80 - 95)  BP: 112/48 (14 Mar 2019 11:45) (112/48 - 153/58)  BP(mean): --  RR: 16 (14 Mar 2019 11:45) (16 - 23)  SpO2: 99% (14 Mar 2019 11:45) (96% - 99%)    PHYSICAL EXAM-    Constitutional: frail, very cachectic elderly female in no acute distress    Head: Head is normocephalic and atraumatic.      Neck: No jugular venous distention. No audible carotid bruits.    Cardiovascular: Regular rate and rhythm without S3, S4. No murmurs or rubs are appreciated.      Respiratory: Breath sounds are normal. No rales. No wheezing.    Abdomen: Soft, nontender, nondistended with positive bowel sounds.      Extremity: No tenderness. No  pitting edema     Neurologic: The patient is alert.    Skin: No rash, no obvious lesions noted.      Psychiatric: The patient appears to be emotionally stable.      INTERPRETATION OF TELEMETRY: not on     ECG: Sinus rythm , T wave inversion in V2-4, prolonged QT interval.  This was compared to her EKG from 18 which did not reveal any T wave inversions.     I&O's Detail      LABS:                        10.4   11.38 )-----------( 246      ( 14 Mar 2019 07:39 )             32.4     -14    137  |  104  |  22  ----------------------------<  129<H>  3.9   |  23  |  0.57    Ca    8.3<L>      14 Mar 2019 07:39  Phos  3.0     03-14  Mg     1.8     -14    TPro  6.4  /  Alb  2.1<L>  /  TBili  0.5  /  DBili  x   /  AST  20  /  ALT  57  /  AlkPhos  115  03-14    CARDIAC MARKERS ( 13 Mar 2019 22:46 )  <0.015 ng/mL / x     / x     / x     / x      CARDIAC MARKERS ( 13 Mar 2019 19:37 )  <0.015 ng/mL / x     / x     / x     / x      CARDIAC MARKERS ( 13 Mar 2019 13:22 )  <0.015 ng/mL / x     / x     / x     / x        Urinalysis Basic - ( 13 Mar 2019 23:09 )    Color: Yellow / Appearance: very cloudy / S.010 / pH: x  Gluc: x / Ketone: Negative  / Bili: Negative / Urobili: Negative mg/dL   Blood: x / Protein: 100 mg/dL / Nitrite: Positive   Leuk Esterase: Moderate / RBC: 0-2 /HPF / WBC >50   Sq Epi: x / Non Sq Epi: Negative / Bacteria: TNTC      I&O's Summary    BNP  RADIOLOGY & ADDITIONAL STUDIES: < from: CT Chest No Cont (19 @ 15:38) >  Bilateral lower lobe pneumonia and atelectasis left worse than right.   Small left pleural effusion.  Additional findings as discussed.    < from: 12 Lead ECG (03.15.19 @ 11:25) >   Sinus tachycardia  Moderate voltage criteria for LVH, may be normal variant  ST & Marked T wave abnormality, consider anterolateral ischemia  Abnormal ECG

## 2019-03-22 NOTE — PROGRESS NOTE ADULT - REASON FOR ADMISSION
cough, weakness

## 2019-03-22 NOTE — PROGRESS NOTE ADULT - ASSESSMENT
A/P:  88 y/o female with h/o dementia, chronic back pain, left foot drop presents from [facility] due to weakness and sob. Consult called for abnormal EKG.    1. Abnormal EKG- there are deep t wave inversions noted in the precordial leads which many times could be related to CNS pathology.   No evidence of ischemia - so Cardiac enzymes negative. No intracranial pathology per CT head.  Close monitoring of the electrolytes. Close monitoring for now. Can start low dose asa/statin.    2. Pneumonia- Management per primary team.    3. Prolonged QT interval- avoid agents that can prolong the QT interval. Mirtazepine can cause prolonged QT interval as <1% adverse effect.  Maintain K of 4 and mag of 2.    4. Continue conservative medical therapy. Pt has no active CP or signs of decompensation at present.   BP/HR well controlled.     5. Outpt f/u as needed. DVT proph.

## 2019-03-27 DIAGNOSIS — J15.6 PNEUMONIA DUE TO OTHER GRAM-NEGATIVE BACTERIA: ICD-10-CM

## 2019-03-27 DIAGNOSIS — G89.29 OTHER CHRONIC PAIN: ICD-10-CM

## 2019-03-27 DIAGNOSIS — R06.02 SHORTNESS OF BREATH: ICD-10-CM

## 2019-03-27 DIAGNOSIS — M21.372 FOOT DROP, LEFT FOOT: ICD-10-CM

## 2019-03-27 DIAGNOSIS — A41.9 SEPSIS, UNSPECIFIED ORGANISM: ICD-10-CM

## 2019-03-27 DIAGNOSIS — E43 UNSPECIFIED SEVERE PROTEIN-CALORIE MALNUTRITION: ICD-10-CM

## 2019-03-27 DIAGNOSIS — F03.90 UNSPECIFIED DEMENTIA WITHOUT BEHAVIORAL DISTURBANCE: ICD-10-CM

## 2019-03-27 DIAGNOSIS — N39.0 URINARY TRACT INFECTION, SITE NOT SPECIFIED: ICD-10-CM

## 2019-03-27 DIAGNOSIS — E88.09 OTHER DISORDERS OF PLASMA-PROTEIN METABOLISM, NOT ELSEWHERE CLASSIFIED: ICD-10-CM

## 2019-03-27 DIAGNOSIS — B96.4 PROTEUS (MIRABILIS) (MORGANII) AS THE CAUSE OF DISEASES CLASSIFIED ELSEWHERE: ICD-10-CM

## 2019-03-27 DIAGNOSIS — I45.81 LONG QT SYNDROME: ICD-10-CM

## 2019-03-27 DIAGNOSIS — Z96.642 PRESENCE OF LEFT ARTIFICIAL HIP JOINT: ICD-10-CM

## 2019-03-29 ENCOUNTER — INBOUND DOCUMENT (OUTPATIENT)
Age: 84
End: 2019-03-29

## 2020-05-04 NOTE — DIETITIAN INITIAL EVALUATION ADULT. - NS FNS WEIGHT USED FOR CALC
[FreeTextEntry1] : Patient with LPR symptoms ? due to reflux. Continue Lansoperazole in AM and Famotidine 40 in PM. To give Zyrtec 10mg OD.\par Will try reflux diet and use Wedge for sleeping.\par EGD in fall.\par \par Time spent in dictating, evaluating, treatment, and counseling 30 min admission/45Kg

## 2021-04-07 NOTE — PHYSICAL THERAPY INITIAL EVALUATION ADULT - PHYSICAL ASSIST/NONPHYSICAL ASSIST: STAND/SIT, REHAB EVAL
Detail Level: Detailed
Quality 226: Preventive Care And Screening: Tobacco Use: Screening And Cessation Intervention: Patient screened for tobacco use and is an ex/non-smoker
Quality 110: Preventive Care And Screening: Influenza Immunization: Influenza Immunization previously received during influenza season
verbal cues/1 person assist

## 2022-04-28 NOTE — DISCHARGE NOTE PROVIDER - HOSPITAL COURSE
----- Message from MATT Fontana sent at 4/28/2022  7:22 AM CDT -----  Please call patient. E. Coli bacterial growth in urine culture. Continue Bactrim as prescribed. If symptoms persist follow up with PCP.   
Conveyed urine culture results to patient, instructed patient to complete Bactrim as prescribed and to follow up with PCP if symptoms persist, questions answered and addressed.  
86 y/o female with h/o dementia, chronic back pain, left foot drop presents from [facility] due to weakness and sob.  associated with fever, 101F, decreased po intake.  much of history provided by daughter and chart due to dementia.  states patient hasnt been progressing well with PT after hip replacement.   is also admitted in [facility] for similar symptoms. In ED, noted to have b/l pna. s/p vanco/cefepime.        3/14: Pt lying in bed, pleasantly confused and offers no complaints. Spoke to daughter at bedside and discussed diagnosis and plan with her.    3/15: Lying in bed, comfortable.  No fever, chills, n, v.    03/16/19: Patient seen and examined. Feels weak and tired. Discussed with daughter at bed side regarding management plan.     03/17/19: No new complaints.     03/18/19: Patient seen and examined. No active issues.     03/19/19: Patient seen and examined. Looks weka and tired.     03/20/19: looks tired    03/21/19: Patient seen and examined. Looks more awake and alert, confused.             Vital Signs Last 24 Hrs    T(C): 36.4 (22 Mar 2019 11:49), Max: 37.2 (21 Mar 2019 16:42)    T(F): 97.6 (22 Mar 2019 11:49), Max: 98.9 (21 Mar 2019 16:42)    HR: 105 (22 Mar 2019 11:49) (74 - 105)    BP: 116/55 (22 Mar 2019 11:49) (111/44 - 130/89)    BP(mean): --    RR: 17 (22 Mar 2019 11:49) (16 - 18)    SpO2: 96% (22 Mar 2019 11:49) (96% - 99%)                PHYSICAL EXAM:        Constitutional: NAD, awake and alert, frail, cachectic,     HEENT: PERR, EOMI, Normal Hearing, MMM, temporal wasting     Neck: Soft and supple    Respiratory: Breath sounds are clear bilaterally, No wheezing, rales or rhonchi    Cardiovascular: S1 and S2, regular rate and rhythm, no Murmurs, gallops or rubs    Gastrointestinal: Bowel Sounds present, soft, nontender, nondistended, no guarding, no rebound    Extremities: No peripheral edema    Neurological: A/O x 3, no focal deficits in my limited exam    Skin: No rashes                    Assessment and Plan:  86 y/o female        sob/cough: sepsis secondary to acute bacterial pneumonia and proteus UTI    -completed cefepime  per ID for probable gram neg bacteria and atypical, S/P zithro for  5days    -duoneb q6h and albuterol prn     -RVP neg    -supportive care    -f/u cultures: BC no growth        t wave inversions on ecg, d/w cardiology-no plan for cath at this time,     -holding asa/plavix, statin, bb    -monitor        Left Hip Hemiarthroplasty: stable    Outpatient ortho follow up        advanced Dementia, malnutrition hypoalbuminemia:    -mentation at baseline     -supportive care    -nutrition consult appreciated         D/C today    Spent more than 30 min to prepare the discharge.

## 2023-11-08 NOTE — INPATIENT CERTIFICATION FOR MEDICARE PATIENTS - IN ORDER TO MEET MEDICARE REQUIREMENTS.
In order to meet Medicare requirements, the clinical documentation must support the information cited in the admission order.  Please be sure to provide detailed and clear documentation about the following in the admitting note/history and physical: 87
